# Patient Record
Sex: MALE | Race: WHITE | HISPANIC OR LATINO | Employment: UNEMPLOYED | URBAN - METROPOLITAN AREA
[De-identification: names, ages, dates, MRNs, and addresses within clinical notes are randomized per-mention and may not be internally consistent; named-entity substitution may affect disease eponyms.]

---

## 2018-06-05 ENCOUNTER — HOSPITAL ENCOUNTER (OUTPATIENT)
Facility: MEDICAL CENTER | Age: 48
End: 2018-06-05
Attending: PHYSICIAN ASSISTANT
Payer: MEDICARE

## 2018-06-05 LAB
ALBUMIN SERPL BCP-MCNC: 3.8 G/DL (ref 3.2–4.9)
ALBUMIN/GLOB SERPL: 1 G/DL
ALP SERPL-CCNC: 104 U/L (ref 30–99)
ALT SERPL-CCNC: 31 U/L (ref 2–50)
ANION GAP SERPL CALC-SCNC: 7 MMOL/L (ref 0–11.9)
AST SERPL-CCNC: 19 U/L (ref 12–45)
BILIRUB SERPL-MCNC: 0.5 MG/DL (ref 0.1–1.5)
BUN SERPL-MCNC: 26 MG/DL (ref 8–22)
CALCIUM SERPL-MCNC: 8.9 MG/DL (ref 8.5–10.5)
CHLORIDE SERPL-SCNC: 106 MMOL/L (ref 96–112)
CO2 SERPL-SCNC: 27 MMOL/L (ref 20–33)
CREAT SERPL-MCNC: 1.17 MG/DL (ref 0.5–1.4)
GLOBULIN SER CALC-MCNC: 3.7 G/DL (ref 1.9–3.5)
GLUCOSE SERPL-MCNC: 115 MG/DL (ref 65–99)
POTASSIUM SERPL-SCNC: 3.7 MMOL/L (ref 3.6–5.5)
PROT SERPL-MCNC: 7.5 G/DL (ref 6–8.2)
SODIUM SERPL-SCNC: 140 MMOL/L (ref 135–145)
T4 FREE SERPL-MCNC: 0.84 NG/DL (ref 0.53–1.43)
TSH SERPL DL<=0.005 MIU/L-ACNC: 3.72 UIU/ML (ref 0.38–5.33)

## 2018-06-05 PROCEDURE — 80053 COMPREHEN METABOLIC PANEL: CPT

## 2018-06-05 PROCEDURE — 84439 ASSAY OF FREE THYROXINE: CPT

## 2018-06-05 PROCEDURE — 84443 ASSAY THYROID STIM HORMONE: CPT

## 2018-06-18 ENCOUNTER — PHYSICAL THERAPY (OUTPATIENT)
Dept: PHYSICAL THERAPY | Facility: REHABILITATION | Age: 48
End: 2018-06-18
Attending: PHYSICIAN ASSISTANT
Payer: MEDICARE

## 2018-06-18 DIAGNOSIS — G81.94 LEFT HEMIPARESIS (HCC): ICD-10-CM

## 2018-06-18 PROCEDURE — 97162 PT EVAL MOD COMPLEX 30 MIN: CPT

## 2018-06-18 ASSESSMENT — ENCOUNTER SYMPTOMS
PAIN SCALE AT LOWEST: 0
EXACERBATED BY: OVERHEAD ACTIVITY
EXACERBATED BY: GRIPPING
PAIN SCALE AT HIGHEST: 0
PAIN SCALE: 0
EXACERBATED BY: WALKING

## 2018-06-18 ASSESSMENT — BALANCE ASSESSMENTS
PLACE ALTERNATE FOOT ON STEP OR STOOL WHILE STANDING UNSUPPORTED: 3
STANDING TO SITTING: 4
SITTING TO STANDING: 4
STANDING UNSUPPORTED ONE FOOT IN FRONT: 3
LONG VERSION TOTAL SCORE (MAX 56): 49
STANDING ON ONE LEG: 2
STANDING UNSUPPORTED WITH FEET TOGETHER: 4
SITTING UNSUPPORTED: 4
LOOK OVER LEFT AND RIGHT SHOULDERS WHILE STANDING: 3
STANDING UNSUPPORTED WITH EYES CLOSED: 4
TRANSFERS: 4
TURN 360 DEGREES: 3
LONG VERSION TOTAL SCORE (MAX 56): 49
STANDING UNSUPPORTED: 4
PICK UP OBJECT FROM THE FLOOR FROM A STANDING POSITION: 4
REACHING FORWARD WITH OUTSTRETCHED ARM WHILE STANDING: 3

## 2018-06-18 NOTE — OP THERAPY EVALUATION
Outpatient Physical Therapy  INITIAL NEUROLOGICAL EVALUATION    [facility] [address].  [address]  [place] [address]  Phone:  [phone]  Fax:  [phone]    Date of Evaluation: 2018    Patient: Lamont Delcid  YOB: 1970  MRN: [id number]     Referring Provider: Gretchen Edgar P.A.-C.  [address] [facility] [address]  [address]  [place] [address]   Referring Diagnosis Hemiplegia, unspecified affecting left nondominant side [G81.94];Nontraumatic intracerebral hemorrhage, unspecified [I61.9]     Time Calculation  Start time: 1500  Stop time: 1600 Time Calculation (min): 60 minutes     Physical Therapy Occurrence Codes    Date of onset of impairment:  6/18/15   Date physical therapy care plan established or reviewed:  18   Date physical therapy treatment started:  18          Chief Complaint: Weakness and Loss Of Balance    Visit Diagnoses     ICD-10-CM   1. Left hemiparesis (HCC) G81.94       Subjective:   History of Present Illness:     Mechanism of injury:  CVA approx 3.5 yrs ago. Pt continues w/decreased function related to L UE weakness and tone. Pt has milder issues in his L LE. Pt had 3 months of therapy in [place] immediately after CVA. Pt also reports a more recent bout of therapy however pt has new insurance that was no longer accepted. He would like to continue working on his hand function and mild balance deficits.  Headaches:  no headaches  Sleep disturbance:  Not disrupted  Pain:     Current pain ratin    At best pain ratin    At worst pain ratin    Aggravating factors:  Gripping, overhead activity and walking  Treatments:     Treatment Comments:  Pt reports using a form of e-stim that allowed him to open his hand functionally. Pt states injections into his hand and forearm were discussed but that there was never any follow thru.  Activities of Daily Living:     Patient reported ADL status: Pt is independent w/ADLs, however reports being  unable to use his L hand functionally. Struggles most trying to help his wife w/things around the house.  Patient Goals:     Patient goals for therapy:  Increased strength and return to work    Other patient goals:  Pt is most concerned about the function in his hand and any potential there may be for RTW.      No past medical history on file.  No past surgical history on file.  Social History   Substance Use Topics   • Smoking status: Not on file   • Smokeless tobacco: Not on file   • Alcohol use Not on file     Family and Occupational History     Social History   • Marital status:      Spouse name: N/A   • Number of children: N/A   • Years of education: N/A       Objective:   Active Range of Motion:   Upper extremity (left):     Elbow extension: Below functional limits (-30 degrees)    Wrist extension: Below functional limits    Fingers flexion: Below functional limits    Fingers extension: Below functional limits (unable to break flexor tone)    Fingers abduction: Below functional limits    Fingers adduction: Below functional limits  Upper extremity (right):     All right upper extremity active range of motion: All within functional limits      Strength:     Right upper extremity strength within functional limits.    Upper extremity strength (left):     Shoulder flexion: 4+    Shoulder abduction: 4+    Shoulder external rotation:  4-    Shoulder internal rotation: 4+    Elbow flexion: 4+    Elbow extension: 4+    Wrist extension: 3-    Fingers extension: 2-    , Prehension, Pinch:  /Prehension (left):      strength: Impaired    Opposition: Impaired  Pinch (left):     Pinch (tip to tip): Impaired    Pinch (3 point): Impaired    Tone, Sensation and Coordination:   Tone:     Left upper extremity tone: L shoulder, elbow, and hand flexors = 1+     Left lower extremity tone: L hip, knee, and plantar flexors = 1+ to 2.    Sensation   Upper extremity (left):     Light touch: Intact  Upper extremity  (right):     Light touch: Intact  Lower extremity (left):     Light touch: Intact  Lower extremity (right):     Light touch: Intact    Coordination   Upper extremity (left):     Fine motor: Impaired    Rapid alternating movements: Impaired    Finger touch to nose: Impaired    Observational Gait   Gait: vaulting     Walking speed and stride length below functional limits.    Left foot contact pattern: foot flat  Left arm swing: decreased      Exercises/Treatment  Time-based treatments/modalities:          Assessment, Response and Plan:   Assessment details:  48 yr old male 3 1/2 years post-CVA. Pt appears w/mild gait and balance dysfunction and more pronounced L UE/hand issues. Pt to benefit from skilled PT to address gait, balance, posture, and gross UE function. Recommend pt f/u w/occupational therapy to address fine motor function.    Goals:   Short Term Goals:   Pt able to stand w/NBOS > 30 sec for improved safety during ambulation.  Pt able to participate in gym/home program for UE strengthening w/use of straps as needed for L UE  Short term goal time span:  2-4 weeks      Long Term Goals:    Pt able to stand on one foot bilaterally >10 seconds  Pt to report 50% improved ability to assist wife w/household tasks.  Long term goal time span:  4-6 weeks    Plan:   Therapy options:  Physical therapy treatment to continue  Planned therapy interventions:  E Stim Unattended (CPT 72836), Gait Training (CPT 56506), Manual Therapy (CPT 48768), Neuromuscular Re-education (CPT 24971) and Therapeutic Exercise (CPT 07248)  Frequency:  2x week  Duration in weeks:  6  Plan details:  Cont skilled PT to address gait, balance, posture, and gross UE function.      Functional Limitation G-Codes and Severity Modifiers  Blevins Balance Total Score (0-56): 49       Referring provider co-signature:  I have reviewed this plan of care and my co-signature certifies the need for services.      Physician Signature:  ________________________________ Date: ______________

## 2018-06-29 ENCOUNTER — PHYSICAL THERAPY (OUTPATIENT)
Dept: PHYSICAL THERAPY | Facility: REHABILITATION | Age: 48
End: 2018-06-29
Attending: PHYSICIAN ASSISTANT
Payer: MEDICARE

## 2018-06-29 DIAGNOSIS — G81.94 LEFT HEMIPARESIS (HCC): ICD-10-CM

## 2018-06-29 PROCEDURE — 97110 THERAPEUTIC EXERCISES: CPT

## 2018-06-29 PROCEDURE — 97112 NEUROMUSCULAR REEDUCATION: CPT

## 2018-06-29 NOTE — OP THERAPY DAILY TREATMENT
Outpatient Physical Therapy  DAILY TREATMENT     [facility] Physical [address].  [address]  [place] [address]  Phone:  [phone]  Fax:  [phone]    Date: 06/29/2018    Patient: Lamont Delcid  YOB: 1970  MRN: [id number]     Time Calculation  Start time: 1030  Stop time: 1125 Time Calculation (min): 55 minutes     Chief Complaint: Loss Of Balance    Visit #: 2    SUBJECTIVE:  Pt still most concerned about L hand.    OBJECTIVE:      Therapeutic Exercises (CPT 76511):     1. Elliptical, x5 min, w/A to contol L knee hyperextension    2. 1/2 foam heel/toe rocking, x20    3. Tandem balance on 1/2 foam, x1 min ea    4. Foam Roller, pec stretch, alt UE OH and lat    5. Ball curls, x30    6. Ball bridge, 3x10    7. UBE, x5 min    Therapeutic Treatments and Modalities:     1. Neuromuscular Re-education (CPT 42651), Obstacle course w/step up, over, 1/2 foam, dynadiscs    Time-based treatments/modalities:  Therapeutic exercise minutes (CPT 55038): 45 minutes  Neuromusc re-ed, balance, coor, post minutes (CPT 42887): 10 minutes       Pain rating before treatment: 0  Pain rating after treatment: 0    ASSESSMENT:   Pt struggled to maintain neutral pelvis. Tolerated high level balance w/mild decreased rxn time.    PLAN/RECOMMENDATIONS:   Plan for treatment: therapy treatment to continue next visit.  Planned interventions for next visit: neuromuscular re-education (CPT 94862) and therapeutic exercise (CPT 69722).

## 2018-07-02 ENCOUNTER — PHYSICAL THERAPY (OUTPATIENT)
Dept: PHYSICAL THERAPY | Facility: REHABILITATION | Age: 48
End: 2018-07-02
Attending: PHYSICIAN ASSISTANT
Payer: MEDICARE

## 2018-07-02 DIAGNOSIS — G81.94 LEFT HEMIPARESIS (HCC): ICD-10-CM

## 2018-07-02 PROCEDURE — 97110 THERAPEUTIC EXERCISES: CPT

## 2018-07-02 NOTE — OP THERAPY DAILY TREATMENT
Outpatient Physical Therapy  DAILY TREATMENT     [facility] Physical [address].  [address]  [place] [address]  Phone:  [phone]  Fax:  [phone]    Date: 07/02/2018    Patient: Lamont Delcid  YOB: 1970  MRN: [id number]     Time Calculation  Start time: 1030  Stop time: 1125 Time Calculation (min): 55 minutes     Chief Complaint: Loss Of Balance    Visit #: 3    SUBJECTIVE:  Pt still most concerned about L hand but wants to continue w/PT for balance.    OBJECTIVE:      Therapeutic Exercises (CPT 71863):     1. Elliptical, x5 min    2. DL balance on BOSU, x1 min    3. 1/4 squats on dynadiscs, x20    4. Rows w/blue TB standing on dbl blue foam, x20    5. Ball curls and bridges alternating, x10 ea    6. SL stance on folder mat pushing skateboard w/opposite foot fwd/back, x20 ea    7. UBE, x5 min      Time-based treatments/modalities:  Therapeutic exercise minutes (CPT 52923): 55 minutes       Pain rating before treatment: 0  Pain rating after treatment: 0    ASSESSMENT:   Very poor control of L knee, relies on hyperextension for balance/control.    PLAN/RECOMMENDATIONS:   Plan for treatment: therapy treatment to continue next visit. Pt to begin OT this week.

## 2018-07-05 ENCOUNTER — APPOINTMENT (OUTPATIENT)
Dept: OCCUPATIONAL THERAPY | Facility: REHABILITATION | Age: 48
End: 2018-07-05
Attending: PHYSICIAN ASSISTANT
Payer: MEDICARE

## 2018-07-06 ENCOUNTER — APPOINTMENT (OUTPATIENT)
Dept: PHYSICAL THERAPY | Facility: REHABILITATION | Age: 48
End: 2018-07-06
Attending: PHYSICIAN ASSISTANT
Payer: MEDICARE

## 2018-07-09 ENCOUNTER — OCCUPATIONAL THERAPY (OUTPATIENT)
Dept: OCCUPATIONAL THERAPY | Facility: REHABILITATION | Age: 48
End: 2018-07-09
Attending: PHYSICIAN ASSISTANT
Payer: MEDICARE

## 2018-07-09 ENCOUNTER — PHYSICAL THERAPY (OUTPATIENT)
Dept: PHYSICAL THERAPY | Facility: REHABILITATION | Age: 48
End: 2018-07-09
Attending: PHYSICIAN ASSISTANT
Payer: MEDICARE

## 2018-07-09 DIAGNOSIS — I61.9 RIGHT-SIDED NONTRAUMATIC INTRACEREBRAL HEMORRHAGE, UNSPECIFIED CEREBRAL LOCATION (HCC): ICD-10-CM

## 2018-07-09 DIAGNOSIS — G81.94 HEMIPARESIS OF LEFT NONDOMINANT SIDE DUE TO CEREBROVASCULAR DISEASE (HCC): ICD-10-CM

## 2018-07-09 DIAGNOSIS — G81.94 LEFT HEMIPARESIS (HCC): ICD-10-CM

## 2018-07-09 DIAGNOSIS — I67.9 HEMIPARESIS OF LEFT NONDOMINANT SIDE DUE TO CEREBROVASCULAR DISEASE (HCC): ICD-10-CM

## 2018-07-09 PROCEDURE — 97166 OT EVAL MOD COMPLEX 45 MIN: CPT

## 2018-07-09 PROCEDURE — 97112 NEUROMUSCULAR REEDUCATION: CPT

## 2018-07-09 PROCEDURE — 97110 THERAPEUTIC EXERCISES: CPT

## 2018-07-09 ASSESSMENT — ENCOUNTER SYMPTOMS: PAIN SCALE: 0

## 2018-07-09 NOTE — OP THERAPY EVALUATION
Outpatient Occupational Therapy  INITIAL NEUROLOGICAL EVALUATION    [facility] Occup[facility] Therapy [address]  [address]  [place] [address]  Phone:  [phone]  Fax:  [phone]    Date of Evaluation: 2018    Patient: Lamont Delcid  YOB: 1970  MRN: [id number]     Referring Provider: Gretchen Edgar P.A.-C.  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 Hemiparesis of left nondominant side due to cerebrovascular disease (HCC) I67.9    G81.94   2. Right-sided nontraumatic intracerebral hemorrhage, unspecified cerebral location (HCC) I61.9       Subjective:   History of Present Illness:     Date of onset:  2015    Mechanism of injury:  S/p right CVA 6/18/15 with residual left hemipareisis and spasticity affecting ability to perform IADLs and work  Prior level of function:  Independent ADLs, working full-time in construction prior to CVA  Pain:     Current pain ratin  Social Support:     Lives in:  One-story house    Lives with:  Spouse (2 teenage boys)  Hand dominance:  Right  Treatments:     Previous treatment:  Physical therapy    Current treatment:  Orthotics    Treatment Comments:  [facility] 18  Activities of Daily Living:     Patient reported ADL status: Modified independent ADLs, Independent driving, Independent walking.  Wife performs homemaking.  Patient  Goals:     Patient goals for therapy:  Return to work, increased strength, increased motion, independence with ADLs/IADLs and return to sport/leisure activities    Other patient goals:  To cook for myself and work      No past medical history on file.  No past surgical history on file.  Social History   Substance Use Topics   • Smoking status: Not on file   • Smokeless tobacco: Not on file   • Alcohol use Not on file     Family and Occupational History     Social History   • Marital status:      Spouse name: N/A   • Number of children: N/A   • Years of education: N/A       Objective:   Active Range of Motion:   Upper extremity (left):     Shoulder flexion: Within functional limits    Shoulder extension: Within functional limits    Shoulder abduction: Within functional limits    Shoulder external rotation: Within functional limits    Shoulder internal rotation: Within functional limits    Elbow flexion: Within functional limits    Elbow extension: Below functional limits    Forearm supination: Below functional limits (45 degrees)    Wrist flexion: Within functional limits    Wrist extension: Within functional limits    Fingers flexion: Below functional limits    Fingers extension: Below functional limits    Fingers abduction: Below functional limits    Fingers adduction: Below functional limits  Upper extremity (right):     All right upper extremity active range of motion: All within functional limits  Active range of motion comments: Left elbow AROM , left thumb with reducible flexion contracture at IP joint and SF at PIP joint.        Passive Range of Motion:   Upper extremity (left):     All left upper extremity passive range of motion: All within functional limits    Strength:     Right upper extremity strength within functional limits.    Upper extremity strength (left):     Shoulder flexion: 4    Shoulder extension:  4    Shoulder abduction: 4    Shoulder adduction: 4    Shoulder external rotation:   4    Shoulder internal rotation: 4    Elbow flexion: 4    Elbow extension: 4-    Forearm pronation: 4    Forearm supination: 3-    Wrist flexion: 4    Wrist extension: 3-    Fingers flexion: 3- (with tonal influence)    Fingers extension: 0 (initially with no active extension)    Fingers abduction: 0    Fingers adduction: 2-    , Prehension, Pinch:  /Prehension (left):      strength: Impaired (15lbs)    Opposition: Impaired  /Prehension (right):      strength: Within functional limits (97 lbs)    Opposition: Within functional limits  Pinch (left):     Pinch Left Lateral: 9 lbs.  Pinch (right):    Pinch (lateral): Within functional limits (19 lbs)    Strength Comments:  After NM re-ed: Left digit extension: digits 3 & 4: 2-/5 strength,  Digits 2 and 5: trace, thumb: 0/5    Tone, Sensation and Coordination:   Tone:     Left upper extremity muscle tone: Hypertonic    Right upper extremity muscle tone: Normal    Modified Juanita:   Upper extremity (left):     Elbow flexors: 1+    Wrist flexors: 1+    Finger flexors: 1+    Tone comments:   Left thumb flexors/adductors: Grade 2    Sensation   Upper extremity (left):     Light touch: Intact    Proprioception: Intact   Upper extremity (right):     Light touch: Intact    Proprioception: Intact     Coordination   Upper extremity (left):     Fine motor: Impaired    Gross motor: Impaired  Upper extremity (right):     Fine motor: Within functional limits    Gross motor: Within functional limits      Coordination comments:   Able to grasp with LH with tonal influence, unable to actively extend once in composite fist    Cognition:     Orientation: normal to time, normal to place and normal to person    Direction following: three step    Short term memory: intact    Long term memory: intact    Attention span: intact    Sequencing: intact    Problem solving: intact    Judgement and safety awareness: intact    Hearing: intact    Vision/Perception:     Visual  tracking: intact    Convergence: intact    Visual attentions: intact    Visual acuity: intact    Visual scanning: intact    R/L neglect: not present        Therapeutic Treatments and Modalities:    1. Neuromuscular Re-education (CPT 62649)    Therapeutic Treatments and Modalities Summary: High speed vibration to left dorsal forearm combined with quick stretch of digit extensors and thumb held in abduction for multiple repetitions of digit extension, successful with MF/RF > IF/SF    Time-based treatments/modalities:  Neuromusc re-ed minutes (CPT 90418): 15 minutes        Assessment, Response and Plan:   Impairments: abnormal coordination, abnormal muscle firing, abnormal muscle tone, abnormal or restricted ROM, difficulty performing job, fine motor function, impaired physical strength and lacks appropriate home exercise program    Assessment details:  Pt is a 48 y.o male s/p right CVA 6/18/15 who presents to outpatient OT functioning below baseline secondary to residual left distal hemipareisis, flexor spasticity, and soft tissue restrictions affecting his ability to perform IADLs and return to work.    Prognosis: good    Goals:   Short Term Goals:   Pt will incorporate his left hand as a gross functional assist during ADLs  Pt will be able to independently relax and remove his left hand after grasping a cylindrical item with moderate difficulty  Pt will be independent positioning of LUE to prevent further contractures  Pt will increase his left digit extension strength 1/2 grade for functional release of objects    Short term goal timespan:  2-4 weeks    Long Term Goals:   Pt will demonstrate a functional left hand grasp and release of ADL/IADL objects with mild difficulty and extra time  Pt will increase his left digit extension strength to >=  2/5 to engage in pre-work activities  Pt will be independent light hot meal prep and clean-up with bilateral integration  Pt will be independent [facility] for stretching and  strengthening         OT long term goal timespan: 8-10.    Plan:   Therapy options:  Occupational therapy treatment to continue  Planned therapy interventions:  E Stim Attended (CPT 46915), Functional Training, Self Care (CPT 69272), Manual Therapy (CPT 13192), Neuromuscular Re-education (CPT 42389), Orthotic Training (CPT 70770), Therapeutic Activities (CPT 82714) and Therapeutic Exercise (CPT 68529)  Frequency:  2x week  Duration in weeks:  10  Duration in visits:  20  Discussed with:  Patient      Functional Limitation G-Codes and Severity Modifiers      Current:     Goal:         Referring provider co-signature:  I have reviewed this plan of care and my co-signature certifies the need for services.  Certification Dates:   From 7/9/18     To 9/27/18    Physician Signature: ________________________________ Date: ______________

## 2018-07-10 NOTE — OP THERAPY DAILY TREATMENT
Outpatient Physical Therapy  DAILY TREATMENT     [facility] Physical [address]  [address]  [place] [address]  Phone:  [phone]  Fax:  [phone]    Date: 07/09/2018    Patient: Lamont Delcid  YOB: 1970  MRN: [id number]     Time Calculation  Start time: 1030  Stop time: 1130 Time Calculation (min): 60 minutes     Chief Complaint: Loss Of Balance    Visit #: 4    SUBJECTIVE:  Pt still most concerned about hand function.    OBJECTIVE:      Therapeutic Exercises (CPT 56869):     1. Elliptical, x3 min    2. DL balance on BOSU, x1 min    3. Nu Step w/speed intervals, x10 min    4. Tandem walk fwd/back, 2x10 ft    5. Ball curls and bridges alternating, x10 ea    6. Sidestep, 2x10 ft    7. Steps across midline, x10 ea    8. Trunk rot w/feet on ball, x10 ea      Time-based treatments/modalities:  Therapeutic exercise minutes (CPT 46399): 60 minutes       ASSESSMENT:   Pt demos slightly improved balance rxns. May benefit from AFO to control L knee hyperextension if pt will be compliant w/use.    PLAN/RECOMMENDATIONS:   Cont PT 2-3 sessions to develop HEP

## 2018-07-11 ENCOUNTER — OCCUPATIONAL THERAPY (OUTPATIENT)
Dept: OCCUPATIONAL THERAPY | Facility: REHABILITATION | Age: 48
End: 2018-07-11
Attending: PHYSICIAN ASSISTANT
Payer: MEDICARE

## 2018-07-11 ENCOUNTER — PHYSICAL THERAPY (OUTPATIENT)
Dept: PHYSICAL THERAPY | Facility: REHABILITATION | Age: 48
End: 2018-07-11
Attending: PHYSICIAN ASSISTANT
Payer: MEDICARE

## 2018-07-11 DIAGNOSIS — I67.9 HEMIPARESIS OF LEFT NONDOMINANT SIDE DUE TO CEREBROVASCULAR DISEASE (HCC): ICD-10-CM

## 2018-07-11 DIAGNOSIS — G81.94 LEFT HEMIPARESIS (HCC): ICD-10-CM

## 2018-07-11 DIAGNOSIS — I61.9 RIGHT-SIDED NONTRAUMATIC INTRACEREBRAL HEMORRHAGE, UNSPECIFIED CEREBRAL LOCATION (HCC): ICD-10-CM

## 2018-07-11 DIAGNOSIS — G81.94 HEMIPARESIS OF LEFT NONDOMINANT SIDE DUE TO CEREBROVASCULAR DISEASE (HCC): ICD-10-CM

## 2018-07-11 PROCEDURE — 97112 NEUROMUSCULAR REEDUCATION: CPT

## 2018-07-11 PROCEDURE — 97110 THERAPEUTIC EXERCISES: CPT

## 2018-07-11 PROCEDURE — 97032 APPL MODALITY 1+ESTIM EA 15: CPT

## 2018-07-11 NOTE — OP THERAPY DAILY TREATMENT
"  Outpatient Occupational Therapy  DAILY TREATMENT     [facility] Occupational [address].  [address]  [place] [address]  Phone:  [phone]  Fax:  [phone]    Date: 07/11/2018    Patient: Lamont Delcid  YOB: 1970  MRN: [id number]     Time Calculation  Start time: 0100  Stop time: 0200 Time Calculation (min): 60 minutes     Chief Complaint: Extremity Weakness (left)    Visit #: 2    SUBJECTIVE:  \"I wear this finger board at night\"    OBJECTIVE:  Current objective measures:   Left hand digit extension: 2-/5 except thumb 0/5  Wrist/digit flexors: 1+ tone on MAS        Therapeutic Treatments and Modalities:    1. E Stim Attended (CPT 94293)    2. Neuromuscular Re-education (CPT 92637)    Therapeutic Treatments and Modalities Summary: TENS 25 Hz left proximal dorsal forearm for wrist/digit extension x 5 minutes, repeated with NMES 40Hz and thumb held in abduction x 10 minutes with good results.  Passive sustained stretch left wrist to digits. High speed vibration to left dorsal forearm, hand over wedge for gross digit extension, thumb held in abduction to decrease tone with good results  Extension exercises initiated rom relaxed state. Left hand digit extension paddle donned, inspected with good fit, instructed to wear only during the day 1-2 hours 2-3 x day  Standing WB over left wrist with hand paddle on, elbow extended 10 reps x 10 second hold  Active digit extension with thumb held in abduction for reproducible extension of all digits, incorporated quick stretch and associated reactions, initiated from a relaxed state    Time-based treatments/modalities:  Neuromusc re-ed minutes (CPT 05881): 45 minutes  E-stim attended minutes (CPT 33709): 15 minutes     ASSESSMENT:   Response to treatment: Pt making very good progress today with left hand neuromuscular recovery in response to both facilitory and inhibitory techniques described above with an increase in reproducible " digit 2-5 extension.  HEP updated and instructed on positioning with good understanding.    PLAN/RECOMMENDATIONS:   Plan for treatment: therapy treatment to continue next visit.  Planned interventions for next visit: continue with current treatment, E-stim attended (CPT 97805) and neuromuscular re-education (CPT 69109)

## 2018-07-11 NOTE — OP THERAPY DAILY TREATMENT
Outpatient Physical Therapy  DAILY TREATMENT     [facility] Physical [address].  [address]  [place] [address]  Phone:  [phone]  Fax:  [phone]    Date: 07/11/2018    Patient: Lamont Delcid  YOB: 1970  MRN: [id number]     Time Calculation  Start time: 0200  Stop time: 0300 Time Calculation (min): 60 minutes     Chief Complaint: Weakness and Loss Of Balance    Visit #: 5    SUBJECTIVE:  Started OT and is feels good about sessions. Wants to continue to work on strength, endurance, and balance during PT sessions.    OBJECTIVE:      Therapeutic Exercises (CPT 45419):     1. Nu Step w/speed intervals, x10 min    2. DL balance on BOSU, x1 min    3. Tandem walk fwd/back on 2x4, 2x10 ft    4. Sidestep on 2x4, 2x10 ft    5. Ball curls and bridges alternating, x10 ea    6. Seated on ball , alt UE, marching, x10 ea    7. Steps across midline, x10 ea    8. Marching w/UE across midline, x50 ft    9. Ski jump, 3x30 sec    10. Rows standing on dynadiscs, x20      Time-based treatments/modalities:  Therapeutic exercise minutes (CPT 37837): 60 minutes       ASSESSMENT:   Pt demos improving balance and good motivation. Struggles most w/control of L knee TKE.    PLAN/RECOMMENDATIONS:   Cont PT w/focus on balance and improving L TKE control.

## 2018-07-16 ENCOUNTER — OCCUPATIONAL THERAPY (OUTPATIENT)
Dept: OCCUPATIONAL THERAPY | Facility: REHABILITATION | Age: 48
End: 2018-07-16
Attending: PHYSICIAN ASSISTANT
Payer: MEDICARE

## 2018-07-16 ENCOUNTER — PHYSICAL THERAPY (OUTPATIENT)
Dept: PHYSICAL THERAPY | Facility: REHABILITATION | Age: 48
End: 2018-07-16
Attending: PHYSICIAN ASSISTANT
Payer: MEDICARE

## 2018-07-16 DIAGNOSIS — G81.94 HEMIPARESIS OF LEFT NONDOMINANT SIDE DUE TO CEREBROVASCULAR DISEASE (HCC): ICD-10-CM

## 2018-07-16 DIAGNOSIS — I67.9 HEMIPARESIS OF LEFT NONDOMINANT SIDE DUE TO CEREBROVASCULAR DISEASE (HCC): ICD-10-CM

## 2018-07-16 DIAGNOSIS — G81.94 LEFT HEMIPARESIS (HCC): ICD-10-CM

## 2018-07-16 DIAGNOSIS — I61.9 RIGHT-SIDED NONTRAUMATIC INTRACEREBRAL HEMORRHAGE, UNSPECIFIED CEREBRAL LOCATION (HCC): ICD-10-CM

## 2018-07-16 PROCEDURE — 97112 NEUROMUSCULAR REEDUCATION: CPT

## 2018-07-16 PROCEDURE — 97032 APPL MODALITY 1+ESTIM EA 15: CPT

## 2018-07-16 PROCEDURE — 97140 MANUAL THERAPY 1/> REGIONS: CPT

## 2018-07-16 PROCEDURE — 97110 THERAPEUTIC EXERCISES: CPT

## 2018-07-16 NOTE — OP THERAPY DAILY TREATMENT
"  Outpatient Occupational Therapy  DAILY TREATMENT     [facility] Occupational [address].  [address]  [place] [address]  Phone:  [phone]  Fax:  [phone]    Date: 07/16/2018    Patient: Lamont Delcid  YOB: 1970  MRN: [id number]     Time Calculation  Start time: 0200  Stop time: 0300 Time Calculation (min): 60 minutes     Chief Complaint: Extremity Weakness (left)    Visit #: 3    SUBJECTIVE: \"I'm good\"    OBJECTIVE:  Current objective measures:   Left hand digit extension: 2-/5 except thumb 0/5  Wrist/digit flexors: 1+ tone on MAS        Therapeutic Treatments and Modalities:    1. E Stim Attended (CPT 42379)    2. Neuromuscular Re-education (CPT 18815)    3. Manual Therapy (CPT 80432)    Therapeutic Treatments and Modalities Summary: Left RHO donned and inspected for fit, volar wrist bar adjusted to increase wrist extension, pt reported improved comfort.  To be worn at night when sleeping and during the day when resting.  NMES 45 Hz left proximal dorsal forearm for wrist/digit extension x 15 minutes with manipulation of pads to achieve maximum results, thumb placed in extension splint and occasionally held in abduction, WB through left forearm when stimulation inactive with good results.  STM to volar/dorsal forearm flexor > extensor musculature with and without [place] for release of trigger points followed by passive sustained stretch left wrist to digits.  Active digit extension a.g incorporating WB, quick stretch, thumb held in abduction, muscle belly tapping, dorsal pressure, and initiated from relaxed position over wedge.  Pt demonstrated ability to perform exercises independently.    Time-based treatments/modalities:  Neuromusc re-ed minutes (CPT 56827): 30 minutes  Manual therapy joint mobilization minutes (CPT 95132): 15 minutes  E-stim attended minutes (CPT 44474): 15 minutes     ASSESSMENT:   Response to treatment:  Pt continues to make good progress " with strength of left hand digit extensors of digits 2-5 in response to facilitory and manual techniques described above.  Pt able to perform multiple repetitions of gross digit extension at 3/4 range of full extension.  HEP and positioning updated with good understanding.  Pt remains highly motivated.    PLAN/RECOMMENDATIONS:   Plan for treatment: therapy treatment to continue next visit.  Planned interventions for next visit: continue with current treatment, E-stim attended (CPT 32329), manual therapy (CPT 86427), neuromuscular re-education (CPT 81337) and therapeutic activities (CPT 03238)

## 2018-07-16 NOTE — OP THERAPY DAILY TREATMENT
Outpatient Physical Therapy  DAILY TREATMENT     [facility] Physical [address].  [address]  [place] [address]  Phone:  [phone]  Fax:  [phone]    Date: 07/16/2018    Patient: Lamont Delcid  YOB: 1970  MRN: [id number]     Time Calculation  Start time: 1030  Stop time: 1130 Time Calculation (min): 60 minutes     Chief Complaint: Loss Of Balance and Weakness    Visit #: 6    SUBJECTIVE:  No new c/o, mildly fatigued after last visit.    OBJECTIVE:      Therapeutic Exercises (CPT 28593):     1. Nu Step w/speed intervals, x10 min, L5    2. DL balance on BOSU, x2 min    3. Tandem walk fwd/back on 2x4, 3x10 ft    4. Sidestep on 2x4, 3x10 ft    5. Ball curls and bridges alternating, x30ea    6. Prone alt UE/LE over ball, x10ea    7. Step over and back on 2x4, x10 ft ea    8. Step down, x20 ea, 6 inch step      Time-based treatments/modalities:  Therapeutic exercise minutes (CPT 21661): 60 minutes       ASSESSMENT:   Pt improved balance rxns w/gait activities w/NBOS    PLAN/RECOMMENDATIONS:   Cont PT, endurance, balance, TKE control.

## 2018-07-18 ENCOUNTER — PHYSICAL THERAPY (OUTPATIENT)
Dept: PHYSICAL THERAPY | Facility: REHABILITATION | Age: 48
End: 2018-07-18
Attending: PHYSICIAN ASSISTANT
Payer: MEDICARE

## 2018-07-18 ENCOUNTER — OCCUPATIONAL THERAPY (OUTPATIENT)
Dept: OCCUPATIONAL THERAPY | Facility: REHABILITATION | Age: 48
End: 2018-07-18
Attending: PHYSICIAN ASSISTANT
Payer: MEDICARE

## 2018-07-18 DIAGNOSIS — I61.9 RIGHT-SIDED NONTRAUMATIC INTRACEREBRAL HEMORRHAGE, UNSPECIFIED CEREBRAL LOCATION (HCC): ICD-10-CM

## 2018-07-18 DIAGNOSIS — I67.9 HEMIPARESIS OF LEFT NONDOMINANT SIDE DUE TO CEREBROVASCULAR DISEASE (HCC): ICD-10-CM

## 2018-07-18 DIAGNOSIS — G81.94 LEFT HEMIPARESIS (HCC): ICD-10-CM

## 2018-07-18 DIAGNOSIS — G81.94 HEMIPARESIS OF LEFT NONDOMINANT SIDE DUE TO CEREBROVASCULAR DISEASE (HCC): ICD-10-CM

## 2018-07-18 PROCEDURE — 97110 THERAPEUTIC EXERCISES: CPT

## 2018-07-18 PROCEDURE — 97032 APPL MODALITY 1+ESTIM EA 15: CPT

## 2018-07-18 PROCEDURE — 97112 NEUROMUSCULAR REEDUCATION: CPT

## 2018-07-18 NOTE — OP THERAPY DAILY TREATMENT
"  Outpatient Physical Therapy  DAILY TREATMENT     [facility] Physical Therapy [address].  [address]  [place] [address]  Phone:  [phone]  Fax:  [phone]    Date: 07/18/2018    Patient: Lamont Delcid  YOB: 1970  MRN: [id number]     Time Calculation  Start time: 1400  Stop time: 1430 Time Calculation (min): 30 minutes     Chief Complaint: Loss Of Balance and Weakness    Visit #: 7    SUBJECTIVE:  Reports doing well and feeling a little stronger w/exercises.    OBJECTIVE:      Therapeutic Exercises (CPT 19694):     1. Nu Step w/speed intervals, x10 min, L5    2. DL balance on BOSU, x1 min    3. Step up/down on BOSU, x10 ea    4. Lat over BOSU, x10 ea    5. 1/4 squat on flat BOSU, x20    6. Prone alt UE/LE over ball, 5x5\" ea      Time-based treatments/modalities:  Therapeutic exercise minutes (CPT 04952): 30 minutes       ASSESSMENT:   Struggled on ball today, demod decreased body awareness.    PLAN/RECOMMENDATIONS:   Cont PT, endurance, balance, TKE control.      "

## 2018-07-18 NOTE — OP THERAPY DAILY TREATMENT
"  Outpatient Occupational Therapy  DAILY TREATMENT     [facility] Occupational [address].  [address]  [place] [address]  Phone:  [phone]  Fax:  [phone]    Date: 07/18/2018    Patient: Lamont Delcid  YOB: 1970  MRN: [id number]     Time Calculation  Start time: 0100  Stop time: 0200 Time Calculation (min): 60 minutes     Chief Complaint: Extremity Weakness (left)    Visit #: 4    SUBJECTIVE: \"I can open my hand a lot more\"    OBJECTIVE:  Current objective measures:   Left hand digit extension: 2-/5 except thumb 0/5, digit flexion 3-/5 with tonal influence, wrist extension 3-/5  Wrist/digit flexors: 1+ tone on MAS        Therapeutic Treatments and Modalities:    1. E Stim Attended (CPT 37786)    2. Neuromuscular Re-education (CPT 17664)    Therapeutic Treatments and Modalities Summary: NMES 45 Hz left proximal dorsal forearm for wrist/digit extension x 15 minutes incorporating active digit extension with excellent response.  Performed active digit extension from relaxed state over wedge with and without external stimulation via muscle belly tapping, vibration, quick stretch, thumb held in abduction and WB following NMES, performed multiple reps with cues to cease extensor activity once digit flexion interferes r/t spasticity.  Left hand placed in curved hand paddle for standing WB through wrist against the wall x 30 seconds 8 reps, elbow extended at -30 degrees.  PNF diagonals D1/D2 patterns with paddle on followed by paddle off x 20 reps.  Grasp, relax, and release of beanbags placed on table.  Ended session with reproducible active gross digit extension initiated from relaxed state.   HEP updated with good understanding.      Time-based treatments/modalities:  Neuromusc re-ed minutes (CPT 56400): 45 minutes  E-stim attended minutes (CPT 44900): 15 minutes     ASSESSMENT:   Response to treatment:  Pt continues to make steady gains with distal LUE strength with " fluctuating levels of gross digit extension in response to facilitory and inhibitory techniques described above.  Improved ability to grasp, relax, and release objects with decreased influence of flexor spasticity.  HEP updated with good understanding.    PLAN/RECOMMENDATIONS:   Plan for treatment: therapy treatment to continue next visit.  Planned interventions for next visit: continue with current treatment, E-stim attended (CPT 67855), neuromuscular re-education (CPT 14978), orthotic training (CPT 48284) and therapeutic activities (CPT 40600)

## 2018-07-20 ENCOUNTER — APPOINTMENT (OUTPATIENT)
Dept: PHYSICAL THERAPY | Facility: REHABILITATION | Age: 48
End: 2018-07-20
Attending: PHYSICIAN ASSISTANT
Payer: MEDICARE

## 2018-07-23 ENCOUNTER — PHYSICAL THERAPY (OUTPATIENT)
Dept: PHYSICAL THERAPY | Facility: REHABILITATION | Age: 48
End: 2018-07-23
Attending: PHYSICIAN ASSISTANT
Payer: MEDICARE

## 2018-07-23 DIAGNOSIS — G81.94 LEFT HEMIPARESIS (HCC): ICD-10-CM

## 2018-07-23 PROCEDURE — 97110 THERAPEUTIC EXERCISES: CPT

## 2018-07-23 NOTE — OP THERAPY DAILY TREATMENT
Outpatient Physical Therapy  DAILY TREATMENT     [facility] Physical Therapy [address].  [address]  [place] [address]  Phone:  [phone]  Fax:  [phone]    Date: 07/23/2018    Patient: Lamont Delcid  YOB: 1970  MRN: [id number]     Time Calculation  Start time: 1130  Stop time: 1230 Time Calculation (min): 60 minutes     Chief Complaint: Loss Of Balance and Weakness    Visit #: 8    SUBJECTIVE:  Pt notes improved mobility of L hand, feels that both therapies are helping.    OBJECTIVE:      Therapeutic Exercises (CPT 81414):     1. Nu Step w/speed intervals, x10 min    2. SL balance on trampoline, 3x30 sec ea    3. Tandem walk fwd/back on 2x4, 3x10 ft    4. Sidestep on 2x4, 3x10 ft    5. DL Shuttle w/wobble board, x50, 6c    6. Steps across midline fwd and back w/OH ball reach, x10 ea bilaterally    7. Tandem walk fwd on 2x4 w/cone taps, 2x10 ft    8. TKE at wall w/ball, 20x10 sec      Time-based treatments/modalities:  Therapeutic exercise minutes (CPT 37769): 60 minutes       ASSESSMENT:   Pt demos improved ability to control L TKE w/cuing.    PLAN/RECOMMENDATIONS:   Cont PT w/cont focus on balance and improving L TKE control.

## 2018-07-25 ENCOUNTER — OCCUPATIONAL THERAPY (OUTPATIENT)
Dept: OCCUPATIONAL THERAPY | Facility: REHABILITATION | Age: 48
End: 2018-07-25
Attending: PHYSICIAN ASSISTANT
Payer: MEDICARE

## 2018-07-25 DIAGNOSIS — I61.9 RIGHT-SIDED NONTRAUMATIC INTRACEREBRAL HEMORRHAGE, UNSPECIFIED CEREBRAL LOCATION (HCC): ICD-10-CM

## 2018-07-25 DIAGNOSIS — G81.94 HEMIPARESIS OF LEFT NONDOMINANT SIDE DUE TO CEREBROVASCULAR DISEASE (HCC): ICD-10-CM

## 2018-07-25 DIAGNOSIS — I67.9 HEMIPARESIS OF LEFT NONDOMINANT SIDE DUE TO CEREBROVASCULAR DISEASE (HCC): ICD-10-CM

## 2018-07-25 PROCEDURE — 97032 APPL MODALITY 1+ESTIM EA 15: CPT

## 2018-07-25 PROCEDURE — 97112 NEUROMUSCULAR REEDUCATION: CPT

## 2018-07-25 NOTE — OP THERAPY DAILY TREATMENT
"  Outpatient Occupational Therapy  DAILY TREATMENT     [facility] Occupational [address].  [address]  [place] [address]  Phone:  [phone]  Fax:  [phone]    Date: 07/25/2018    Patient: Lamont Delcid  YOB: 1970  MRN: [id number]     Time Calculation  Start time: 0100  Stop time: 0130 Time Calculation (min): 30 minutes     Chief Complaint: Extremity Weakness (left)    Visit #: 5    SUBJECTIVE: \"Very good\"    OBJECTIVE:  Current objective measures:   Left hand digit extension: 2/5 except thumb 0/5, digit flexion 3-/5 with tonal influence, wrist extension 3-/5  Wrist/digit flexors: 1+ tone on MAS        Therapeutic Treatments and Modalities:    1. E Stim Attended (CPT 58733)    2. Neuromuscular Re-education (CPT 87240)    Therapeutic Treatments and Modalities Summary: NMES 43 Hz left dorsal forearm x 10 minutes for focus on thumb/digit extension combined with active movements with fair response of thumb extension EPL > EPB. Performed active digit extension from relaxed position over wedge, with and without thumb held in abduction, a.g and g.e, and from shoulder ff at 150 degrees to fully extend digits 2-5, added phill strap for IF/MF during above positions to maintain extension x 5 seconds before influence of flexor spasticity.  Ended session with reproducible active gross digit extension initiated from relaxed state- able to maintain full extension of digits 2-5 for 2 seconds.   HEP updated with good understanding.      Time-based treatments/modalities:  Neuromusc re-ed minutes (CPT 52534): 20 minutes  E-stim attended minutes (CPT 80343): 10 minutes     ASSESSMENT:   Response to treatment: Pt making good progress today with an increase in active extension of left hand digits 2-5 in response to facilitory techniques described above including use of phill strap to maintain extension for longer duration.  No active thumb extension.  HEP updated with good understanding. "     PLAN/RECOMMENDATIONS:   Plan for treatment: therapy treatment to continue next visit.  Planned interventions for next visit: continue with current treatment, E-stim attended (CPT 02958), neuromuscular re-education (CPT 38707), orthotic training (CPT 35529) and therapeutic activities (CPT 05763)

## 2018-08-08 ENCOUNTER — PHYSICAL THERAPY (OUTPATIENT)
Dept: PHYSICAL THERAPY | Facility: REHABILITATION | Age: 48
End: 2018-08-08
Attending: PHYSICIAN ASSISTANT
Payer: MEDICARE

## 2018-08-08 ENCOUNTER — OCCUPATIONAL THERAPY (OUTPATIENT)
Dept: OCCUPATIONAL THERAPY | Facility: REHABILITATION | Age: 48
End: 2018-08-08
Attending: PHYSICIAN ASSISTANT
Payer: MEDICARE

## 2018-08-08 DIAGNOSIS — I67.9 HEMIPARESIS OF LEFT NONDOMINANT SIDE DUE TO CEREBROVASCULAR DISEASE (HCC): ICD-10-CM

## 2018-08-08 DIAGNOSIS — G81.94 HEMIPARESIS OF LEFT NONDOMINANT SIDE DUE TO CEREBROVASCULAR DISEASE (HCC): ICD-10-CM

## 2018-08-08 DIAGNOSIS — G81.94 LEFT HEMIPARESIS (HCC): ICD-10-CM

## 2018-08-08 DIAGNOSIS — I61.9 RIGHT-SIDED NONTRAUMATIC INTRACEREBRAL HEMORRHAGE, UNSPECIFIED CEREBRAL LOCATION (HCC): ICD-10-CM

## 2018-08-08 PROCEDURE — 97110 THERAPEUTIC EXERCISES: CPT

## 2018-08-08 PROCEDURE — 97112 NEUROMUSCULAR REEDUCATION: CPT

## 2018-08-08 PROCEDURE — 97032 APPL MODALITY 1+ESTIM EA 15: CPT

## 2018-08-08 NOTE — OP THERAPY DAILY TREATMENT
Outpatient Physical Therapy  DAILY TREATMENT     [facility] Physical [address].  [address]  [place] [address]  Phone:  [phone]  Fax:  [phone]    Date: 08/08/2018    Patient: Lamont Delcid  YOB: 1970  MRN: [id number]     Time Calculation  Start time: 0230  Stop time: 0330 Time Calculation (min): 60 minutes     Chief Complaint: Weakness and Loss Of Balance    Visit #: 9    SUBJECTIVE:  Continues to report small improvements.    OBJECTIVE:      Therapeutic Exercises (CPT 08016):     1. Nu Step w/speed intervals, x10 min    2. DL balance on discs w/rowing, x20    3. Tandem walk fwd/back on 2x4, 3x10 ft    4. Sidestep on 2x4, 3x10 ft    5. DL Shuttle, x30, 8c    6. SL Shuttle, x20 ea, 6c    7. [place], 3x10 ft ea      Time-based treatments/modalities:  Therapeutic exercise minutes (CPT 07597): 60 minutes       ASSESSMENT:   Pt w/steady progress demonstrating increased balance rxns..    PLAN/RECOMMENDATIONS:   Cont PT w/cont focus on balance and improving L TKE control.

## 2018-08-08 NOTE — OP THERAPY DAILY TREATMENT
"  Outpatient Occupational Therapy  DAILY TREATMENT     [facility] Occupational [address].  [address]  [place] [address]  Phone:  [phone]  Fax:  [phone]    Date: 08/08/2018    Patient: Lamont Delcid  YOB: 1970  MRN: [id number]     Time Calculation  Start time: 0130  Stop time: 0230 Time Calculation (min): 60 minutes     Chief Complaint: Hand Weakness (left)    Visit #: 6    SUBJECTIVE: \"I've been doing the exercises\"    OBJECTIVE:  Current objective measures:   Left hand strength: digit extension: 2/5 except thumb 0/5, digit flexion 3-/5 with tonal influence, wrist extension 3-/5  Left hand tone: Wrist/digit 3-5 flexors: Grade 1 tone on MAS, Thumb/IF: Grade 1+ spasticity          Therapeutic Treatments and Modalities:    1. E Stim Attended (CPT 63942)    2. Neuromuscular Re-education (CPT 48117)    Therapeutic Treatments and Modalities Summary: NMES 35 Hz left dorsal forearm x 10 minutes for focus on wrist/digit extension followed by thumb/IF extension x 5 minutes with improved response of thumb extension EPL > EPB. Performed active gross digit extension from relaxed position over wedge to digit extension with shoulder ff at 150 degrees to fully extend digits 2-5, moderate influence of flexor spasticity in digits 1 and 2, especially when fatigued.  Transport of warm water from 1 basin to another using LH with wash cloth with focus on gross digit extension/flexion.  Fabricated thumb extension strap to increase extension at MCP joint and web space for improved functional grasp/release of objects, practiced with dry wash cloth on table.  WB through left hand with thumb held in abduction and dorsal pressure x 10 reps.  Active digit extension increased at end of session.  HEP updated with good understanding.      Time-based treatments/modalities:  Neuromusc re-ed minutes (CPT 20621): 45 minutes  E-stim attended minutes (CPT 72838): 15 minutes      ASSESSMENT: "   Response to treatment: Pt making progress with left hand neuromuscular recovery in response to both facilitory and inhibitory techniques described above with an increase in gross digit extension of digits 2-5.  Thumb remains without any volitional movement.  Thumb extension strap fabricated to increase ability to grasp and release during functional activities.  HEP updated with good understanding.    PLAN/RECOMMENDATIONS:   Plan for treatment: therapy treatment to continue next visit.  Planned interventions for next visit: continue with current treatment, E-stim attended (CPT 70072), neuromuscular re-education (CPT 02845), orthotic training (CPT 80227) and therapeutic activities (CPT 28472)

## 2018-08-10 ENCOUNTER — APPOINTMENT (OUTPATIENT)
Dept: PHYSICAL THERAPY | Facility: REHABILITATION | Age: 48
End: 2018-08-10
Attending: PHYSICIAN ASSISTANT
Payer: MEDICARE

## 2018-08-14 ENCOUNTER — APPOINTMENT (OUTPATIENT)
Dept: OCCUPATIONAL THERAPY | Facility: REHABILITATION | Age: 48
End: 2018-08-14
Attending: PHYSICIAN ASSISTANT
Payer: MEDICARE

## 2018-08-14 ENCOUNTER — APPOINTMENT (OUTPATIENT)
Dept: PHYSICAL THERAPY | Facility: REHABILITATION | Age: 48
End: 2018-08-14
Attending: PHYSICIAN ASSISTANT
Payer: MEDICARE

## 2018-08-15 ENCOUNTER — OCCUPATIONAL THERAPY (OUTPATIENT)
Dept: OCCUPATIONAL THERAPY | Facility: REHABILITATION | Age: 48
End: 2018-08-15
Attending: PHYSICIAN ASSISTANT
Payer: MEDICARE

## 2018-08-15 DIAGNOSIS — G81.94 HEMIPARESIS OF LEFT NONDOMINANT SIDE DUE TO CEREBROVASCULAR DISEASE (HCC): ICD-10-CM

## 2018-08-15 DIAGNOSIS — I67.9 HEMIPARESIS OF LEFT NONDOMINANT SIDE DUE TO CEREBROVASCULAR DISEASE (HCC): ICD-10-CM

## 2018-08-15 DIAGNOSIS — I61.9 RIGHT-SIDED NONTRAUMATIC INTRACEREBRAL HEMORRHAGE, UNSPECIFIED CEREBRAL LOCATION (HCC): ICD-10-CM

## 2018-08-15 PROCEDURE — 97760 ORTHOTIC MGMT&TRAING 1ST ENC: CPT

## 2018-08-15 PROCEDURE — 97014 ELECTRIC STIMULATION THERAPY: CPT

## 2018-08-15 NOTE — OP THERAPY DAILY TREATMENT
"  Outpatient Occupational Therapy  DAILY TREATMENT     [facility] Occupational [address].  [address]  [place] [address]  Phone:  [phone]  Fax:  [phone]    Date: 08/15/2018    Patient: Lamont Delcid  YOB: 1970  MRN: [id number]     Time Calculation  Start time: 1035  Stop time: 1135 Time Calculation (min): 60 minutes     Chief Complaint: Hand Weakness (left)    Visit #: 7    SUBJECTIVE: \"I lost the hand strap\"    OBJECTIVE:  Current objective measures:   Left hand strength: digit extension: 2/5 except thumb 0/5, digit flexion 3-/5 with tonal influence, wrist extension 3-/5  Left hand tone: Wrist flexors: Grade 1 tone on MAS, Thumb flexors/adductors Grade 1+ spasticity.        Therapeutic Treatments and Modalities:    1. E Stim Attended (CPT 50737)    2. Orthotic Training (CPT 58805)    Therapeutic Treatments and Modalities Summary: NMES 48 Hz left dorsal forearm x 10 minutes for focus on wrist/digit extension and thumb incorporating active movements.  Active digit extension with shoulder in 150 degrees of ff.  Fabricated left hand based orthoplast thumb spica splint to maintain functional hand position given no active thumb extension.  Pt reported comfort along with an improved ability to use left hand functionally including an increase in active digit extension in digits 2-5.  Pt instructed on skin inspection and wear schedule during the day.    Time-based treatments/modalities:  Orthotics training initial, minutes (CPT 01731): 55 minutes  E-stim attended minutes (CPT 00576): 10 minutes      ASSESSMENT:   Response to treatment:  Pt responded well to fabrication of left thumb spica splint to maintain hand in functional position given no active thumb extension.  Splint facilitated an improvement active digit extension and will prevent contractures of thumb flexors.  Pt pleased with results and verbalized good understanding of wear schedule/skin inspection.  " Continue with HEP.    PLAN/RECOMMENDATIONS:   Plan for treatment: therapy treatment to continue next visit.  Planned interventions for next visit: continue with current treatment, E-stim attended (CPT 94856), neuromuscular re-education (CPT 69625), orthotic training (CPT 40104) and therapeutic activities (CPT 62266)

## 2018-08-20 ENCOUNTER — APPOINTMENT (OUTPATIENT)
Dept: OCCUPATIONAL THERAPY | Facility: REHABILITATION | Age: 48
End: 2018-08-20
Payer: MEDICARE

## 2018-08-22 ENCOUNTER — OCCUPATIONAL THERAPY (OUTPATIENT)
Dept: OCCUPATIONAL THERAPY | Facility: REHABILITATION | Age: 48
End: 2018-08-22
Attending: PHYSICIAN ASSISTANT
Payer: MEDICARE

## 2018-08-22 ENCOUNTER — PHYSICAL THERAPY (OUTPATIENT)
Dept: PHYSICAL THERAPY | Facility: REHABILITATION | Age: 48
End: 2018-08-22
Attending: PHYSICIAN ASSISTANT
Payer: MEDICARE

## 2018-08-22 ENCOUNTER — APPOINTMENT (OUTPATIENT)
Dept: OCCUPATIONAL THERAPY | Facility: REHABILITATION | Age: 48
End: 2018-08-22
Payer: MEDICARE

## 2018-08-22 DIAGNOSIS — G81.94 LEFT HEMIPARESIS (HCC): ICD-10-CM

## 2018-08-22 DIAGNOSIS — I67.9 HEMIPARESIS OF LEFT NONDOMINANT SIDE DUE TO CEREBROVASCULAR DISEASE (HCC): ICD-10-CM

## 2018-08-22 DIAGNOSIS — G81.94 HEMIPARESIS OF LEFT NONDOMINANT SIDE DUE TO CEREBROVASCULAR DISEASE (HCC): ICD-10-CM

## 2018-08-22 PROCEDURE — 97763 ORTHC/PROSTC MGMT SBSQ ENC: CPT

## 2018-08-22 PROCEDURE — 97110 THERAPEUTIC EXERCISES: CPT | Mod: XE

## 2018-08-22 NOTE — OP THERAPY DAILY TREATMENT
Outpatient Physical Therapy  DAILY TREATMENT     [facility] Physical Therapy [address]  [address]  [place] [address]  Phone:  [phone]  Fax:  [phone]    Date: 08/22/2018    Patient: Lamont Delcid  YOB: 1970  MRN: [id number]     Time Calculation  Start time: 1130  Stop time: 1200 Time Calculation (min): 30 minutes     Chief Complaint: Loss Of Balance and Weakness    Visit #: 10    SUBJECTIVE:  Continues to report small improvements.    OBJECTIVE:      Therapeutic Exercises (CPT 20258):     1. Nu Step w/speed intervals, x20 min    2. DL balance on BOSU, x2 min, EO & EC    3. Tandem walk fwd/back , 3x10 ft    4. DL shuttle w/wobble board, x30, 8c    5. Wall ball squats, x30    6. Ski jump, x1 min      Time-based treatments/modalities:  Therapeutic exercise minutes (CPT 96347): 30 minutes       ASSESSMENT:   Pt continues w/incremental improvements in LE strength and balance.    PLAN/RECOMMENDATIONS:   Cont PT w/cont focus on rotational/trunk control

## 2018-08-22 NOTE — OP THERAPY DAILY TREATMENT
"  Outpatient Occupational Therapy  DAILY TREATMENT     [facility] Occupational Therapy [address].  [address]  [place] [address]  Phone:  [phone]  Fax:  [phone]    Date: 08/22/2018    Patient: Lamont Delcid  YOB: 1970  MRN: [id number]     Time Calculation  Start time: 1100  Stop time: 1130 Time Calculation (min): 30 minutes     Chief Complaint: Hand Weakness (left)    Visit #: 8    SUBJECTIVE: \"I like the hand splint a lot but the thumb part broke off\"    OBJECTIVE:  Current objective measures:  Left thumb extension 0/5        Therapeutic Treatments and Modalities:    1. Orthotic Training (CPT 41621)    Therapeutic Treatments and Modalities Summary: Left hand-based thumb spica splint modified to re-attach thumb component with anterior/posterior reinforcement using orthoplast.  Added padding where appropriate and re-strapped thumb.  Flared out area of light pressure in web space.      Time-based treatments/modalities:  Orthotic/Prosthetic subsq visit, minutes (CPT 10543): 30 minutes      ASSESSMENT:   Response to treatment:  Pt responded well to modifications made to left hand-based thumb spica splint with reports of improved comfort and stability.  Verbalized understanding of wear schedule and on-going skin inspection.    PLAN/RECOMMENDATIONS:   Plan for treatment: therapy treatment to continue next visit.  Planned interventions for next visit: continue with current treatment, neuromuscular re-education (CPT 14721), orthotic training (CPT 63150) and therapeutic activities (CPT 64948)      "

## 2018-08-29 ENCOUNTER — OCCUPATIONAL THERAPY (OUTPATIENT)
Dept: OCCUPATIONAL THERAPY | Facility: REHABILITATION | Age: 48
End: 2018-08-29
Attending: PHYSICIAN ASSISTANT
Payer: MEDICARE

## 2018-08-29 DIAGNOSIS — I61.9 RIGHT-SIDED NONTRAUMATIC INTRACEREBRAL HEMORRHAGE, UNSPECIFIED CEREBRAL LOCATION (HCC): ICD-10-CM

## 2018-08-29 DIAGNOSIS — I67.9 HEMIPARESIS OF LEFT NONDOMINANT SIDE DUE TO CEREBROVASCULAR DISEASE (HCC): ICD-10-CM

## 2018-08-29 DIAGNOSIS — G81.94 HEMIPARESIS OF LEFT NONDOMINANT SIDE DUE TO CEREBROVASCULAR DISEASE (HCC): ICD-10-CM

## 2018-08-29 PROCEDURE — 97112 NEUROMUSCULAR REEDUCATION: CPT

## 2018-08-29 PROCEDURE — 97032 APPL MODALITY 1+ESTIM EA 15: CPT

## 2018-08-29 NOTE — OP THERAPY DAILY TREATMENT
"  Outpatient Occupational Therapy  DAILY TREATMENT     [facility] Occupational [address].  [address]  [place] [address]  Phone:  [phone]  Fax:  [phone]    Date: 08/29/2018    Patient: Lamont Delcid  YOB: 1970  MRN: [id number]     Time Calculation  Start time: 1030  Stop time: 1130 Time Calculation (min): 60 minutes     Chief Complaint: Extremity Weakness (left)    Visit #: 9    SUBJECTIVE: \"I can  things better with the hand splint, I'm wearing the splint during the day and at night, I can open my hand much better\"    OBJECTIVE:  Current objective measures:   Left hand strength: digit extension: 2/5 except thumb 0/5, digit flexion 3/5 with tonal influence, wrist extension 3-/5  Spasticity: Digit flexors: Grade 1 except thumb 1+, thumb adductors 1+, Wrist flexors: Grade 1        Therapeutic Treatments and Modalities:    1. E Stim Attended (CPT 05093)    2. Neuromuscular Re-education (CPT 58367)    Therapeutic Treatments and Modalities Summary: NMES 48 Hz left mid-distal dorsal forearm x 15 minutes for focus on wrist/digit extension and thumb incorporating gross flexion without stimulation, active attempts at extension in conjunction with stimulation.  Active digit/wrist  extension with shoulder at 150 degrees of abd multiple reps. Various sized cones to grasp, raise, and place over dynamic target, cones laid sideways on table for pt to move to upright position and return to side, grasp and placed cones at multiple areas of table using light functional grasp secondary to flexor spasticity.  Kinesiotape placed on left thumb to facilitate extension for improved performance of grasp/release of cones.  Grasp/release of small objects with LH and reinforced kinesiotape.  Pt to include into HEP.    Time-based treatments/modalities:  Neuromusc re-ed minutes (CPT 70103): 45 minutes  E-stim attended minutes (CPT 27507): 15 minutes     ASSESSMENT:   Response to " treatment:  Pt making good progress with left hand functional grasp and release of various sized objects with decreased influence of flexor tone during release component.  Significantly improved performance with use of kinesiotape to facilitate thumb extension.  Flexor tone of digits, specifically the thumb, is a contributing factor limiting active thumb extension and abduction. HEP updated with good understanding.    PLAN/RECOMMENDATIONS:   Plan for treatment: therapy treatment to continue next visit.  Planned interventions for next visit: continue with current treatment, E-stim attended (CPT 82764), neuromuscular re-education (CPT 18650), orthotic training (CPT 38261) and therapeutic activities (CPT 32245)

## 2018-09-05 ENCOUNTER — APPOINTMENT (OUTPATIENT)
Dept: OCCUPATIONAL THERAPY | Facility: REHABILITATION | Age: 48
End: 2018-09-05
Attending: PHYSICIAN ASSISTANT
Payer: MEDICARE

## 2018-09-05 ENCOUNTER — PHYSICAL THERAPY (OUTPATIENT)
Dept: PHYSICAL THERAPY | Facility: REHABILITATION | Age: 48
End: 2018-09-05
Attending: PHYSICIAN ASSISTANT
Payer: MEDICARE

## 2018-09-05 DIAGNOSIS — G81.94 LEFT HEMIPARESIS (HCC): ICD-10-CM

## 2018-09-05 DIAGNOSIS — I61.9 RIGHT-SIDED NONTRAUMATIC INTRACEREBRAL HEMORRHAGE, UNSPECIFIED CEREBRAL LOCATION (HCC): ICD-10-CM

## 2018-09-05 DIAGNOSIS — I67.9 HEMIPARESIS OF LEFT NONDOMINANT SIDE DUE TO CEREBROVASCULAR DISEASE (HCC): ICD-10-CM

## 2018-09-05 DIAGNOSIS — G81.94 HEMIPARESIS OF LEFT NONDOMINANT SIDE DUE TO CEREBROVASCULAR DISEASE (HCC): ICD-10-CM

## 2018-09-05 PROCEDURE — 97112 NEUROMUSCULAR REEDUCATION: CPT

## 2018-09-05 PROCEDURE — 97110 THERAPEUTIC EXERCISES: CPT

## 2018-09-05 NOTE — OP THERAPY DAILY TREATMENT
Outpatient Physical Therapy  DAILY TREATMENT     [facility] Physical Therapy [address]  [address]  [place] [address]  Phone:  [phone]  Fax:  [phone]    Date: 09/05/2018    Patient: Lamont Delcid  YOB: 1970  MRN: [id number]     Time Calculation  Start time: 0900  Stop time: 1000 Time Calculation (min): 60 minutes     Chief Complaint: Difficulty Walking; Loss Of Balance; and Weakness    Visit #: 11    SUBJECTIVE:  Pt reports balance is better and he is able to walk more easily.     OBJECTIVE:      Therapeutic Exercises (CPT 99868):     1. Nu Step , w/speed intervals x10 min, L5 x10 min    2. DL balance on BOSU, x2 min, EO & EC    3. Tandem walk fwd/back on 2x4, 3x10 ft    4. DL shuttle w/wobble board, x30, 8c    5. Wall ball squats, x30    6. Dead bug w/contra ball isometrics, x10 ea    7. Sidestep on 2x4, 3x10 ft    8. Trunk rot w/TB, x20 ea    9. Standing lower trunk rot, x20 ea      Time-based treatments/modalities:  Therapeutic exercise minutes (CPT 31694): 60 minutes       ASSESSMENT:   Pt demos improved functional balance, still needs consistent VCs to attend to L LE position.    PLAN/RECOMMENDATIONS:   Cont PT for rotational/trunk control, LE strength, balance.

## 2018-09-05 NOTE — OP THERAPY DAILY TREATMENT
"  Outpatient Occupational Therapy  DAILY TREATMENT     [facility] Occupational [address].  [address]  [place] [address]  Phone:  [phone]  Fax:  [phone]    Date: 09/05/2018    Patient: Lamont Delcid  YOB: 1970  MRN: [id number]     Time Calculation  Start time: 1000  Stop time: 1030 Time Calculation (min): 30 minutes     Chief Complaint: Extremity Weakness (left)    Visit #: 10    SUBJECTIVE: \"I'm under a lot of stress trying to find an apartment\"    OBJECTIVE:  Current objective measures:   Left hand strength: digit extension: 2/5 except thumb 0/5, digit flexion 3/5 with tonal influence, wrist extension 3-/5  Spasticity: Digit flexors: Grade 1 except thumb 1+, thumb adductors 1+, Wrist flexors: Grade 1        Therapeutic Treatments and Modalities:    1. Neuromuscular Re-education (CPT 25256)    Therapeutic Treatments and Modalities Summary: Kinesiotape placed on left thumb to facilitate extension/abduction for improved performance of left hand grasp/release of various sized cones, bottles, small wooden cubes, plastic discs, and tongue depressors.  Velcro board using BH to roll large dowel forward/backwards x 8 reps.  Standing WB through left wrist on table vs cone held in hand for pushing towel up/down and circular motions while standing at wall.  Pt to include into HEP.       Time-based treatments/modalities:  Neuromusc re-ed minutes (CPT 27281): 30 minutes      ASSESSMENT:   Response to treatment: Pt making slow but steady progress with his left hand NM recovery in response to facilitory techniques as well as compensatory techniques via kinesiotape to improve functional grasp/release of objects.  Thumb extension/abduction remains 0/5 strength.  Updated HEP with good understanding.    PLAN/RECOMMENDATIONS:   Plan for treatment: therapy treatment to continue next visit.  Planned interventions for next visit: continue with current treatment, E-stim attended (CPT " 04340), neuromuscular re-education (CPT 55308), therapeutic activities (CPT 10104) and therapeutic exercise (CPT 72618)

## 2018-09-11 ENCOUNTER — APPOINTMENT (OUTPATIENT)
Dept: PHYSICAL THERAPY | Facility: REHABILITATION | Age: 48
End: 2018-09-11
Attending: PHYSICIAN ASSISTANT
Payer: MEDICARE

## 2018-09-11 ENCOUNTER — OCCUPATIONAL THERAPY (OUTPATIENT)
Dept: OCCUPATIONAL THERAPY | Facility: REHABILITATION | Age: 48
End: 2018-09-11
Attending: PHYSICIAN ASSISTANT
Payer: MEDICARE

## 2018-09-11 DIAGNOSIS — I61.9 RIGHT-SIDED NONTRAUMATIC INTRACEREBRAL HEMORRHAGE, UNSPECIFIED CEREBRAL LOCATION (HCC): ICD-10-CM

## 2018-09-11 DIAGNOSIS — I67.9 HEMIPARESIS OF LEFT NONDOMINANT SIDE DUE TO CEREBROVASCULAR DISEASE (HCC): ICD-10-CM

## 2018-09-11 DIAGNOSIS — G81.94 HEMIPARESIS OF LEFT NONDOMINANT SIDE DUE TO CEREBROVASCULAR DISEASE (HCC): ICD-10-CM

## 2018-09-11 PROCEDURE — 97112 NEUROMUSCULAR REEDUCATION: CPT

## 2018-09-11 NOTE — OP THERAPY DAILY TREATMENT
"  Outpatient Occupational Therapy  DAILY TREATMENT     [facility] Occupational Therapy [address].  [address]  [place] [address]  Phone:  [phone]  Fax:  [phone]    Date: 09/11/2018    Patient: Lamont Delcid  YOB: 1970  MRN: [id number]     Time Calculation  Start time: 0300  Stop time: 0400 Time Calculation (min): 60 minutes     Chief Complaint: Hand Weakness (left)    Visit #: 11    SUBJECTIVE: \"I feel good\"    OBJECTIVE:  Current objective measures:   Left hand strength: digit extension: 2/5 except thumb 2-/5, digit flexion 3/5 with tonal influence, wrist extension 3/5  Spasticity: Digit flexors: Grade 1 except thumb 1+, thumb adductors 1+, Wrist flexors: Grade 1        Therapeutic Treatments and Modalities:    1. Neuromuscular Re-education (CPT 73838)    Therapeutic Treatments and Modalities Summary: STM left volar forearm for release of soft tissue adhesione with and without Guasha, passive sustained stretch left wrist/digits into full extension, STM to thenar eminence for full stretch of flexors.  Attempts at gross digit extension with thumb held in abduction for inhibitory purposes from a relaxed state with fair results.  Placed 1.5 inch tubigrip sleeve over digits 2-5 for performance of gross extension when raised into 150 degrees of shoulder ff, repeated with PNF diagonals D1/D2 patterns x 15 reps.  Able to achieve full gross extension with hand/wrist sleeve on along with maintaining thumb in neutral.  Repeated D1 pattern without sleeve, able to reproduce active thumb extension.  HEP updated with good understanding. Replaced thumb strap on orthosis secondary to wear and tear.    Time-based treatments/modalities:  Neuromusc re-ed minutes (CPT 07896): 60 minutes      ASSESSMENT:   Response to treatment:  Pt with an excellent response to placement of tubigrip sleeve over left hand/wrist for full gross extension when performing PNF diagonals in raised shoulder " position while being able to maintain thumb in neutral position rather than baseline fully  flexed spastic position.  Pt able to demonstrate new reproducible volitional activation of thumb extensors following above interventions.  Pt also demonstrating an increase in wrist extension.  HEP updated with good understanding.    PLAN/RECOMMENDATIONS:   Plan for treatment: therapy treatment to continue next visit.  Planned interventions for next visit: continue with current treatment, E-stim attended (CPT 82218), manual therapy (CPT 65205), neuromuscular re-education (CPT 72230) and therapeutic activities (CPT 43328)

## 2018-09-13 ENCOUNTER — APPOINTMENT (OUTPATIENT)
Dept: OCCUPATIONAL THERAPY | Facility: REHABILITATION | Age: 48
End: 2018-09-13
Attending: PHYSICIAN ASSISTANT
Payer: MEDICARE

## 2018-09-13 ENCOUNTER — APPOINTMENT (OUTPATIENT)
Dept: PHYSICAL THERAPY | Facility: REHABILITATION | Age: 48
End: 2018-09-13
Attending: PHYSICIAN ASSISTANT
Payer: MEDICARE

## 2018-09-17 ENCOUNTER — APPOINTMENT (OUTPATIENT)
Dept: PHYSICAL THERAPY | Facility: REHABILITATION | Age: 48
End: 2018-09-17
Attending: PHYSICIAN ASSISTANT
Payer: MEDICARE

## 2018-09-17 ENCOUNTER — OCCUPATIONAL THERAPY (OUTPATIENT)
Dept: OCCUPATIONAL THERAPY | Facility: REHABILITATION | Age: 48
End: 2018-09-17
Attending: PHYSICIAN ASSISTANT
Payer: MEDICARE

## 2018-09-17 DIAGNOSIS — G81.94 HEMIPARESIS OF LEFT NONDOMINANT SIDE DUE TO CEREBROVASCULAR DISEASE (HCC): ICD-10-CM

## 2018-09-17 DIAGNOSIS — I67.9 HEMIPARESIS OF LEFT NONDOMINANT SIDE DUE TO CEREBROVASCULAR DISEASE (HCC): ICD-10-CM

## 2018-09-17 DIAGNOSIS — I61.9 RIGHT-SIDED NONTRAUMATIC INTRACEREBRAL HEMORRHAGE, UNSPECIFIED CEREBRAL LOCATION (HCC): ICD-10-CM

## 2018-09-17 PROCEDURE — 97112 NEUROMUSCULAR REEDUCATION: CPT

## 2018-09-17 NOTE — OP THERAPY DAILY TREATMENT
"  Outpatient Occupational Therapy  DAILY TREATMENT     [facility] Occupational Therapy [address].  [address]  [place] [address]  Phone:  [phone]  Fax:  [phone]    Date: 09/17/2018    Patient: Lamont Delcid  YOB: 1970  MRN: [id number]     Time Calculation  Start time: 0300  Stop time: 0400 Time Calculation (min): 60 minutes     Chief Complaint: Extremity Weakness (left)    Visit #: 12    SUBJECTIVE: \"My hand is less tight\"    OBJECTIVE:  Current objective measures:   Left hand strength: digit extension: 2+/5 except thumb 2-/5, digit flexion 3/5 with tonal influence, wrist extension 3/5  Spasticity: Digit flexors: Grade 1 except thumb 1+, thumb adductors 1, Wrist flexors: Grade 1        Therapeutic Treatments and Modalities:    1. Neuromuscular Re-education (CPT 93911)    Therapeutic Treatments and Modalities Summary: STM left volar-medial forearm for release of soft tissue adhesions with and without Guasha, passive sustained stretch left wrist/digits into full extension and abduction, STM to thenar eminence for full stretch of flexors and web space.  Sequence of left shoulder abduction at 120 degrees combined with gross digit extension, return to midline table for WB x 5 seconds, repeat to initial position x 20 reps.   Placed 1.5 inch tubigrip sleeve over digits 2-5 for performance of gross extension when raised into 150 degrees of shoulder abduction for PNF diagonal D1 x 15 reps, shoulder abducted to 90 with forearm in neutral for attempts at gross digit extension, successful with digits 2-5 but without thumb extension.  Left thumb joint approximation with pressure over flexor tendons for attempts at thumb extension vs tactile stimulation to thumb extensors.  HEP updated with good understanding.  Replaced thumb strap and issued new tubigrip sleeve      Time-based treatments/modalities:  Neuromusc re-ed minutes (CPT 69293): 60 minutes        ASSESSMENT:   Response to " treatment: Pt making slow but steady gains today with left hand strength and motor control in response to NM re-ed described above incorporating both facilitory and inhibitory techniques with an increase in duration of extension of digits 2-5 with decreased influence of spasticity.  Thumb extension remains limited due to hemipareisis and flexor spasticity.  Pt may benefit from Botox injections to thumb flexors.  HEP updated with good understanding.    PLAN/RECOMMENDATIONS:   Plan for treatment: therapy treatment to continue next visit.  Planned interventions for next visit: continue with current treatment, E-stim attended (CPT 27010), functional training/self care (CPT 88668), neuromuscular re-education (CPT 35494), orthotic training (CPT 01338) and therapeutic activities (CPT 07561)

## 2018-09-24 ENCOUNTER — OCCUPATIONAL THERAPY (OUTPATIENT)
Dept: OCCUPATIONAL THERAPY | Facility: REHABILITATION | Age: 48
End: 2018-09-24
Attending: PHYSICIAN ASSISTANT
Payer: MEDICARE

## 2018-09-24 ENCOUNTER — PHYSICAL THERAPY (OUTPATIENT)
Dept: PHYSICAL THERAPY | Facility: REHABILITATION | Age: 48
End: 2018-09-24
Attending: PHYSICIAN ASSISTANT
Payer: MEDICARE

## 2018-09-24 DIAGNOSIS — I67.9 HEMIPARESIS OF LEFT NONDOMINANT SIDE DUE TO CEREBROVASCULAR DISEASE (HCC): ICD-10-CM

## 2018-09-24 DIAGNOSIS — G81.94 HEMIPARESIS OF LEFT NONDOMINANT SIDE DUE TO CEREBROVASCULAR DISEASE (HCC): ICD-10-CM

## 2018-09-24 DIAGNOSIS — I61.9 RIGHT-SIDED NONTRAUMATIC INTRACEREBRAL HEMORRHAGE, UNSPECIFIED CEREBRAL LOCATION (HCC): ICD-10-CM

## 2018-09-24 DIAGNOSIS — G81.94 LEFT HEMIPARESIS (HCC): ICD-10-CM

## 2018-09-24 PROCEDURE — 97763 ORTHC/PROSTC MGMT SBSQ ENC: CPT

## 2018-09-24 PROCEDURE — 97032 APPL MODALITY 1+ESTIM EA 15: CPT

## 2018-09-24 PROCEDURE — 97110 THERAPEUTIC EXERCISES: CPT

## 2018-09-24 PROCEDURE — 97112 NEUROMUSCULAR REEDUCATION: CPT

## 2018-09-24 PROCEDURE — 97530 THERAPEUTIC ACTIVITIES: CPT

## 2018-09-24 NOTE — OP THERAPY DAILY TREATMENT
Outpatient Physical Therapy  DAILY TREATMENT     [facility] Physical Therapy [address].  [address]  [place] [address]  Phone:  [phone]  Fax:  [phone]    Date: 09/24/2018    Patient: Lamont Delcid  YOB: 1970  MRN: [id number]     Time Calculation  Start time: 1030  Stop time: 1100 Time Calculation (min): 30 minutes     Chief Complaint: Loss Of Balance and Difficulty Walking    Visit #: 12    SUBJECTIVE:  Pt slowly improving, continues to report imbalance w/prolonged walking/activity    OBJECTIVE:      Therapeutic Exercises (CPT 59964):     1. Nu Step ,  x10 min, L5-8, w/speed intervals    2. Lateral over BOSU, x10 ea    3. Clock cone taps    4. Wall ball squats, x30      Time-based treatments/modalities:  Therapeutic exercise minutes (CPT 51852): 30 minutes       ASSESSMENT:   Pt w/improved control of hip/LE position. Challenged most by NBOS      PLAN/RECOMMENDATIONS:   Cont PT x2 visits for rotational/trunk control, LE strength, balance.

## 2018-09-24 NOTE — OP THERAPY DAILY TREATMENT
"  Outpatient Occupational Therapy  DAILY TREATMENT     [facility] Occupational [address]  [address]  [place] [address]  Phone:  [phone]  Fax:  [phone]    Date: 09/24/2018    Patient: Lamont Delcid  YOB: 1970  MRN: [id number]     Time Calculation  Start time: 0900  Stop time: 1000 Time Calculation (min): 60 minutes     Chief Complaint: Extremity Weakness (left)    Visit #: 13    SUBJECTIVE: \"My left hand feels loose\"    OBJECTIVE:  Current objective measures:   Left hand strength: digit extension: 2+/5 except thumb 2-/5, digit flexion 3/5 with tonal influence, wrist extension 3/5  Spasticity: Digit flexors: Grade 1 except thumb 1+, thumb adductors 1, Wrist flexors: Grade 1        Therapeutic Treatments and Modalities:    1. E Stim Attended (CPT 20128)    2. Neuromuscular Re-education (CPT 93062)    3. Orthotic Training (CPT 35957)    4. Therapeutic Activities (CPT 05390)    Therapeutic Treatments and Modalities Summary: NMES 42 Hz left mid-distal dorsal forearm x 15 minutes for focus on wrist/digit extension and thumb incorporating active attempts at extension in conjunction with stimulation.  STM left thumb flexors, surgitube place over left thumb to assist with IP extension for PNF diagonals D1 pattern x 20.  Coban wrapped over left thumb and anchored at wrist to facilitate a functional grasping of various sized/shapes and place in bucket.  Fabricated 1/2 inch velfoam strap anchored at wrist similar as above with thumb held in abduction/extension to improved ability to functionally grasp/release of objects above.    Time-based treatments/modalities:  Therapeutic activity minutes (CPT 75628): 15 minutes  Orthotic/Prosthetic subsq visit, minutes (CPT 00517): 15 minutes  Neuromusc re-ed minutes (CPT 43932): 15 minutes  E-stim attended minutes (CPT 39197): 15 minutes     ASSESSMENT:   Response to treatment: Pt making good progress today with the use of " compensatory techniques via extension strap to improve ability to functionally grasp and release various sized objects using LH with decreased influence of spasticity.  Instructed pt to wear strap during the day instead of the hand splint, which can be worn at night.  Pt verbalized understanding of wear schedule.    PLAN/RECOMMENDATIONS:   Plan for treatment: therapy treatment to continue next visit.  Planned interventions for next visit: continue with current treatment, neuromuscular re-education (CPT 66522), orthotic training (CPT 62944), therapeutic activities (CPT 36899) and therapeutic exercise (CPT 15719)

## 2018-10-03 ENCOUNTER — OCCUPATIONAL THERAPY (OUTPATIENT)
Dept: OCCUPATIONAL THERAPY | Facility: REHABILITATION | Age: 48
End: 2018-10-03
Attending: PHYSICIAN ASSISTANT
Payer: MEDICARE

## 2018-10-03 DIAGNOSIS — I61.9 RIGHT-SIDED NONTRAUMATIC INTRACEREBRAL HEMORRHAGE, UNSPECIFIED CEREBRAL LOCATION (HCC): ICD-10-CM

## 2018-10-03 DIAGNOSIS — I67.9 HEMIPARESIS OF LEFT NONDOMINANT SIDE DUE TO CEREBROVASCULAR DISEASE (HCC): ICD-10-CM

## 2018-10-03 DIAGNOSIS — G81.94 HEMIPARESIS OF LEFT NONDOMINANT SIDE DUE TO CEREBROVASCULAR DISEASE (HCC): ICD-10-CM

## 2018-10-03 PROCEDURE — 97032 APPL MODALITY 1+ESTIM EA 15: CPT

## 2018-10-03 PROCEDURE — 97112 NEUROMUSCULAR REEDUCATION: CPT

## 2018-10-03 NOTE — OP THERAPY DAILY TREATMENT
"  Outpatient Occupational Therapy  DAILY TREATMENT     [facility] Occupational [address].  [address]  [place] [address]  Phone:  [phone]  Fax:  [phone]    Date: 10/03/2018    Patient: Lamont Delcid  YOB: 1970  MRN: [id number]     Time Calculation  Start time: 0930  Stop time: 1030 Time Calculation (min): 60 minutes     Chief Complaint: Extremity Weakness (left)    Visit #: 14    SUBJECTIVE: \"The problem is my thumb\"    OBJECTIVE:  Current objective measures:   Left hand strength: digit extension: 2+/5 except thumb 2-/5, digit flexion 3/5 with tonal influence, wrist extension 3/5  Spasticity: Digit flexors: Grade 1+ except thumb flexion 1+,   thumb adductors 1+, Wrist flexors: Grade 1        Therapeutic Treatments and Modalities:    1. E Stim Attended (CPT 29097)    2. Neuromuscular Re-education (CPT 68574)    Therapeutic Treatments and Modalities Summary: NMES 42 Hz left mid-dorsal forearm (active) and distal radial forearm (indifferent) x 15 minutes for focus on thumb extension/abduction combined with attempts at active extension of all digits during stimulation.  Mirror Box therapy initiated with focus on digit/wrist flexion/extension and forearm supination/pronation.  Performed various sequences with active RH movement only vs LH digits held in extension during RH movement vs LH passively moved simultaneously and attempts at simultaneous movement.   Repeated attempts at simultaneous movements without Mirror Box without success as flexor spasticity interferes.  Supine on mat for passive PNF diagonals D1 pattern with active digit extension when from initiated position with fair response.       Time-based treatments/modalities:  Neuromusc re-ed minutes (CPT 56533): 45 minutes  E-stim attended minutes (CPT 17229): 15 minutes      ASSESSMENT:   Response to treatment:  Pt making slow progress today with his LH neuromuscular recovery in response to facilitory and " inhibitory techniques to improve digit extension for functional grasp and release.  Pt's digit flexor tone has increased during attempts at extension and abduction with consequential decreased active movement.  Attempted to contact his PA to discuss possible pharmacological interventions to reduce spasticity.  PA was not available.  Will re-attempt this week.  Continue with HEP and positioning.    PLAN/RECOMMENDATIONS:   Plan for treatment: therapy treatment to continue next visit.  Planned interventions for next visit: continue with current treatment, E-stim attended (CPT 37448), neuromuscular re-education (CPT 53175), orthotic training (CPT 27863) and therapeutic activities (CPT 74554)

## 2018-10-05 ENCOUNTER — APPOINTMENT (OUTPATIENT)
Dept: PHYSICAL THERAPY | Facility: REHABILITATION | Age: 48
End: 2018-10-05
Attending: PHYSICIAN ASSISTANT
Payer: MEDICARE

## 2018-10-09 ENCOUNTER — PHYSICAL THERAPY (OUTPATIENT)
Dept: PHYSICAL THERAPY | Facility: REHABILITATION | Age: 48
End: 2018-10-09
Attending: PHYSICIAN ASSISTANT
Payer: MEDICARE

## 2018-10-09 ENCOUNTER — OCCUPATIONAL THERAPY (OUTPATIENT)
Dept: OCCUPATIONAL THERAPY | Facility: REHABILITATION | Age: 48
End: 2018-10-09
Attending: PHYSICIAN ASSISTANT
Payer: MEDICARE

## 2018-10-09 DIAGNOSIS — G81.94 HEMIPARESIS OF LEFT NONDOMINANT SIDE DUE TO CEREBROVASCULAR DISEASE (HCC): ICD-10-CM

## 2018-10-09 DIAGNOSIS — I67.9 HEMIPARESIS OF LEFT NONDOMINANT SIDE DUE TO CEREBROVASCULAR DISEASE (HCC): ICD-10-CM

## 2018-10-09 DIAGNOSIS — I61.9 RIGHT-SIDED NONTRAUMATIC INTRACEREBRAL HEMORRHAGE, UNSPECIFIED CEREBRAL LOCATION (HCC): ICD-10-CM

## 2018-10-09 DIAGNOSIS — G81.94 LEFT HEMIPARESIS (HCC): ICD-10-CM

## 2018-10-09 PROCEDURE — 97032 APPL MODALITY 1+ESTIM EA 15: CPT

## 2018-10-09 PROCEDURE — 97112 NEUROMUSCULAR REEDUCATION: CPT

## 2018-10-09 PROCEDURE — 97110 THERAPEUTIC EXERCISES: CPT

## 2018-10-09 NOTE — OP THERAPY DAILY TREATMENT
"  Outpatient Occupational Therapy  DAILY TREATMENT     [facility] Occupational [address].  [address]  [place] [address]  Phone:  [phone]  Fax:  [phone]    Date: 10/09/2018    Patient: Lamont Delcid  YOB: 1970  MRN: [id number]     Time Calculation  Start time: 0100  Stop time: 0200 Time Calculation (min): 60 minutes     Chief Complaint: Hand Weakness (left weakness, spasticity)    Visit #: 15    SUBJECTIVE: \"I will get the medicine and try it\"    OBJECTIVE:  Current objective measures:   Left hand strength: digit extension: 2+/5 except thumb 2-/5, digit flexion 3/5 with tonal influence, wrist extension 3/5  Spasticity: Digit flexors: Grade 2   thumb adductors 1+, Wrist flexors: Grade 1        Therapeutic Treatments and Modalities:    1. E Stim Attended (CPT 13355)    2. Neuromuscular Re-education (CPT 19638)    Therapeutic Treatments and Modalities Summary: NMES at 40 Hz for left thumb extension with positive result (active over dorsal thumb between CMC and MCP joint, indifferent mid-dorsal forearm.  Mirror Box therapy initiated with focus on digit/wrist flexion/extension, opposition thumb to IF, isolated thumb flexion/extension,activities such as placing/removing/stacking poker chips into bowl 1 at a time, and grasping/releasing bottle combined with moving location of bottle.  LH passively moved simultaneously with RH into gross flexion/extension and isolated thumb flexion/extension  Active simultaneous movements flexion-relaxation-extension sequence requiring manual assistance for extension, repeated without Mirror Box bilateral hands for extension from relaxed position with left thumb held in abduction with fair success, repeated for isolated thumb extension without success due to strong influence of flexor spasticity    Time-based treatments/modalities:  Neuromusc re-ed minutes (CPT 56884): 45 minutes  E-stim attended minutes (CPT 40016): 15 minutes  "     ASSESSMENT:   Response to treatment:  Pt making slow gains today in his LUE neuromuscular recovery in response to facilitory and inhibitory techniques described above due to persistent flexor spasticity and hemipareisis.  Informed pt of discussion I had with his PA regarding anti-spasticity medication for which she prescribed Baclofen.  Pt encouraged to take this medication as he initially wasn't interested, but was in agreement after explanation of benefits.  Continue with HEP to include mirror box.    PLAN/RECOMMENDATIONS:   Plan for treatment: therapy treatment to continue next visit.  Planned interventions for next visit: continue with current treatment, E-stim attended (CPT 09205), functional training/self care (CPT 94006) and neuromuscular re-education (CPT 03737)

## 2018-10-09 NOTE — OP THERAPY DAILY TREATMENT
Outpatient Physical Therapy  DAILY TREATMENT     [facility] Physical Therapy [address]  [address]  [place] [address]  Phone:  [phone]  Fax:  [phone]    Date: 10/09/2018    Patient: Lamont Delcid  YOB: 1970  MRN: [id number]     Time Calculation  Start time: 1430  Stop time: 1515 Time Calculation (min): 45 minutes     Chief Complaint: Loss Of Balance and Weakness    Visit #: 13    SUBJECTIVE:  Pt slowly improving, continues to report imbalance w/prolonged walking/activity    OBJECTIVE:      Therapeutic Exercises (CPT 23316):     1. Nu Step , x10 min, w/speed intervals    2. DL balance on BOSU, x2 min    3. Tandem walk fwd/back on 2x4, 3x10 ft ea    4. Sidestep on 2x4, 3x10 ft ea    5. Ball bridge and curl, x10    6. 4-way mod plank off edge of mat, x10 sec ea, req'd A to support L hand and elbow      Time-based treatments/modalities:  Therapeutic exercise minutes (CPT 73576): 45 minutes       ASSESSMENT:  Pt demo'd improved balance rxs on 2x4.     PLAN/RECOMMENDATIONS:   Cont PT x1 visit after [place] to reassess function and review HEP.

## 2018-10-30 ENCOUNTER — APPOINTMENT (OUTPATIENT)
Dept: OCCUPATIONAL THERAPY | Facility: REHABILITATION | Age: 48
End: 2018-10-30
Attending: PHYSICIAN ASSISTANT
Payer: MEDICARE

## 2018-11-06 ENCOUNTER — APPOINTMENT (OUTPATIENT)
Dept: OCCUPATIONAL THERAPY | Facility: REHABILITATION | Age: 48
End: 2018-11-06
Attending: PHYSICIAN ASSISTANT
Payer: MEDICARE

## 2018-11-08 ENCOUNTER — APPOINTMENT (OUTPATIENT)
Dept: OCCUPATIONAL THERAPY | Facility: REHABILITATION | Age: 48
End: 2018-11-08
Attending: PHYSICIAN ASSISTANT
Payer: MEDICARE

## 2018-11-08 ENCOUNTER — APPOINTMENT (OUTPATIENT)
Dept: PHYSICAL THERAPY | Facility: REHABILITATION | Age: 48
End: 2018-11-08
Attending: PHYSICIAN ASSISTANT
Payer: MEDICARE

## 2018-11-13 ENCOUNTER — APPOINTMENT (OUTPATIENT)
Dept: OCCUPATIONAL THERAPY | Facility: REHABILITATION | Age: 48
End: 2018-11-13
Attending: PHYSICIAN ASSISTANT
Payer: MEDICARE

## 2018-11-13 ENCOUNTER — APPOINTMENT (OUTPATIENT)
Dept: PHYSICAL THERAPY | Facility: REHABILITATION | Age: 48
End: 2018-11-13
Attending: PHYSICIAN ASSISTANT
Payer: MEDICARE

## 2018-11-20 ENCOUNTER — APPOINTMENT (OUTPATIENT)
Dept: OCCUPATIONAL THERAPY | Facility: REHABILITATION | Age: 48
End: 2018-11-20
Attending: PHYSICIAN ASSISTANT
Payer: MEDICARE

## 2018-11-20 ENCOUNTER — TELEPHONE (OUTPATIENT)
Dept: OCCUPATIONAL THERAPY | Facility: REHABILITATION | Age: 48
End: 2018-11-20

## 2018-11-21 NOTE — OP THERAPY DISCHARGE SUMMARY
"  Outpatient Occupational Therapy  DISCHARGE SUMMARY NOTE    [facility] Occupational Therapy [address]  [address]  [place] [address]  Phone:  [phone]  Fax:  [phone]    Date of Visit: 11/20/2018    Patient: Lamont Delcid  YOB: 1970  MRN: [id number]     Referring Provider: Gretchen Edgar P.A.-C.   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  Pt did not show up for his scheduled appointment today and when he was called, he stated that he \"would not be able to proceed with therapy at this time\"     Limitations Remaining:  unknown    Recommendations:  D/C to HEP.  D/C OT.    [place] MS,OTR/L    Date: 11/20/2018  "

## 2018-11-27 ENCOUNTER — APPOINTMENT (OUTPATIENT)
Dept: PHYSICAL THERAPY | Facility: REHABILITATION | Age: 48
End: 2018-11-27
Attending: PHYSICIAN ASSISTANT
Payer: MEDICARE

## 2018-11-27 ENCOUNTER — APPOINTMENT (OUTPATIENT)
Dept: OCCUPATIONAL THERAPY | Facility: REHABILITATION | Age: 48
End: 2018-11-27
Attending: PHYSICIAN ASSISTANT
Payer: MEDICARE

## 2019-02-08 ENCOUNTER — HOSPITAL ENCOUNTER (OUTPATIENT)
Dept: LAB | Facility: MEDICAL CENTER | Age: 49
End: 2019-02-08
Attending: PHYSICIAN ASSISTANT
Payer: MEDICARE

## 2019-02-08 LAB
ALBUMIN SERPL BCP-MCNC: 3.4 G/DL (ref 3.2–4.9)
ALBUMIN/GLOB SERPL: 1 G/DL
ALP SERPL-CCNC: 84 U/L (ref 30–99)
ALT SERPL-CCNC: 13 U/L (ref 2–50)
ANION GAP SERPL CALC-SCNC: 8 MMOL/L (ref 0–11.9)
AST SERPL-CCNC: 12 U/L (ref 12–45)
BASOPHILS # BLD AUTO: 0.7 % (ref 0–1.8)
BASOPHILS # BLD: 0.05 K/UL (ref 0–0.12)
BILIRUB SERPL-MCNC: 0.2 MG/DL (ref 0.1–1.5)
BUN SERPL-MCNC: 13 MG/DL (ref 8–22)
CALCIUM SERPL-MCNC: 9 MG/DL (ref 8.5–10.5)
CHLORIDE SERPL-SCNC: 109 MMOL/L (ref 96–112)
CHOLEST SERPL-MCNC: 132 MG/DL (ref 100–199)
CO2 SERPL-SCNC: 24 MMOL/L (ref 20–33)
CREAT SERPL-MCNC: 1.01 MG/DL (ref 0.5–1.4)
EOSINOPHIL # BLD AUTO: 0.2 K/UL (ref 0–0.51)
EOSINOPHIL NFR BLD: 2.9 % (ref 0–6.9)
ERYTHROCYTE [DISTWIDTH] IN BLOOD BY AUTOMATED COUNT: 46.8 FL (ref 35.9–50)
GLOBULIN SER CALC-MCNC: 3.4 G/DL (ref 1.9–3.5)
GLUCOSE SERPL-MCNC: 104 MG/DL (ref 65–99)
HCT VFR BLD AUTO: 40.8 % (ref 42–52)
HDLC SERPL-MCNC: 32 MG/DL
HGB BLD-MCNC: 13.8 G/DL (ref 14–18)
IMM GRANULOCYTES # BLD AUTO: 0.02 K/UL (ref 0–0.11)
IMM GRANULOCYTES NFR BLD AUTO: 0.3 % (ref 0–0.9)
LDLC SERPL CALC-MCNC: 82 MG/DL
LYMPHOCYTES # BLD AUTO: 1.3 K/UL (ref 1–4.8)
LYMPHOCYTES NFR BLD: 18.7 % (ref 22–41)
MCH RBC QN AUTO: 29.2 PG (ref 27–33)
MCHC RBC AUTO-ENTMCNC: 33.8 G/DL (ref 33.7–35.3)
MCV RBC AUTO: 86.3 FL (ref 81.4–97.8)
MONOCYTES # BLD AUTO: 0.49 K/UL (ref 0–0.85)
MONOCYTES NFR BLD AUTO: 7.1 % (ref 0–13.4)
NEUTROPHILS # BLD AUTO: 4.89 K/UL (ref 1.82–7.42)
NEUTROPHILS NFR BLD: 70.3 % (ref 44–72)
NRBC # BLD AUTO: 0 K/UL
NRBC BLD-RTO: 0 /100 WBC
PLATELET # BLD AUTO: 106 K/UL (ref 164–446)
PMV BLD AUTO: 12.2 FL (ref 9–12.9)
POTASSIUM SERPL-SCNC: 4 MMOL/L (ref 3.6–5.5)
PROT SERPL-MCNC: 6.8 G/DL (ref 6–8.2)
RBC # BLD AUTO: 4.73 M/UL (ref 4.7–6.1)
SODIUM SERPL-SCNC: 141 MMOL/L (ref 135–145)
TRIGL SERPL-MCNC: 88 MG/DL (ref 0–149)
TSH SERPL DL<=0.005 MIU/L-ACNC: 3.2 UIU/ML (ref 0.38–5.33)
WBC # BLD AUTO: 7 K/UL (ref 4.8–10.8)

## 2019-02-08 PROCEDURE — 85025 COMPLETE CBC W/AUTO DIFF WBC: CPT

## 2019-02-08 PROCEDURE — 80061 LIPID PANEL: CPT

## 2019-02-08 PROCEDURE — 83036 HEMOGLOBIN GLYCOSYLATED A1C: CPT

## 2019-02-08 PROCEDURE — 80053 COMPREHEN METABOLIC PANEL: CPT

## 2019-02-08 PROCEDURE — 84443 ASSAY THYROID STIM HORMONE: CPT

## 2019-02-09 LAB
EST. AVERAGE GLUCOSE BLD GHB EST-MCNC: 114 MG/DL
HBA1C MFR BLD: 5.6 % (ref 0–5.6)

## 2020-04-02 ENCOUNTER — TELEPHONE (OUTPATIENT)
Dept: PHYSICAL THERAPY | Facility: REHABILITATION | Age: 50
End: 2020-04-02

## 2020-04-02 NOTE — OP THERAPY DISCHARGE SUMMARY
Outpatient Physical Therapy  DISCHARGE SUMMARY NOTE      [facility] Therapy [address].  [address]  [place] [address]  Phone:  [phone]  Fax:  [phone]    Date of Visit: 04/02/2020    Patient: Lamont Delcid  YOB: 1970  MRN: [id number]     Referring Provider: No referring provider defined for this encounter.   Referring Diagnosis No admission diagnoses are documented for this encounter.     Physical Therapy Occurrence Codes    Date of onset of impairment:  6/18/15   Date physical therapy care plan established or reviewed:  6/18/18   Date physical therapy treatment started:  6/18/18          Functional Assessment Used        Your patient is being discharged from Physical Therapy with the following comments:   · Patient was seen for skilled physical therapy following CVA between 6/18/18-10/9/18. Patient did not return for follow up treatment and appropriate DC paperwork was not completed. Providing PT is a PR[employer] therapist and is rarely in the clinic so DC paperwork completed on their behalf.    Little Beasley, PT, DPT    Date: 4/2/2020

## 2020-09-22 ENCOUNTER — HOSPITAL ENCOUNTER (OUTPATIENT)
Facility: MEDICAL CENTER | Age: 50
End: 2020-09-22
Payer: MEDICARE

## 2020-10-22 ENCOUNTER — APPOINTMENT (OUTPATIENT)
Dept: RADIOLOGY | Facility: MEDICAL CENTER | Age: 50
DRG: 871 | End: 2020-10-22
Attending: EMERGENCY MEDICINE
Payer: MEDICARE

## 2020-10-22 ENCOUNTER — HOSPITAL ENCOUNTER (INPATIENT)
Facility: MEDICAL CENTER | Age: 50
LOS: 4 days | DRG: 871 | End: 2020-10-26
Attending: EMERGENCY MEDICINE | Admitting: STUDENT IN AN ORGANIZED HEALTH CARE EDUCATION/TRAINING PROGRAM
Payer: MEDICARE

## 2020-10-22 DIAGNOSIS — J96.01 ACUTE RESPIRATORY FAILURE WITH HYPOXIA (HCC): ICD-10-CM

## 2020-10-22 DIAGNOSIS — A41.9 SEPSIS, DUE TO UNSPECIFIED ORGANISM, UNSPECIFIED WHETHER ACUTE ORGAN DYSFUNCTION PRESENT (HCC): ICD-10-CM

## 2020-10-22 DIAGNOSIS — J18.9 PNEUMONIA OF BOTH LUNGS DUE TO INFECTIOUS ORGANISM, UNSPECIFIED PART OF LUNG: ICD-10-CM

## 2020-10-22 PROBLEM — Z20.822 SUSPECTED COVID-19 VIRUS INFECTION: Status: ACTIVE | Noted: 2020-10-22

## 2020-10-22 PROBLEM — I10 HTN (HYPERTENSION): Status: ACTIVE | Noted: 2020-10-22

## 2020-10-22 PROBLEM — Z86.73 H/O: CVA (CEREBROVASCULAR ACCIDENT): Status: ACTIVE | Noted: 2020-10-22

## 2020-10-22 LAB
ALBUMIN SERPL BCP-MCNC: 3.5 G/DL (ref 3.2–4.9)
ALBUMIN/GLOB SERPL: 0.9 G/DL
ALP SERPL-CCNC: 103 U/L (ref 30–99)
ALT SERPL-CCNC: 14 U/L (ref 2–50)
ANION GAP SERPL CALC-SCNC: 20 MMOL/L (ref 7–16)
APPEARANCE UR: CLEAR
AST SERPL-CCNC: 16 U/L (ref 12–45)
BACTERIA #/AREA URNS HPF: NEGATIVE /HPF
BASOPHILS # BLD AUTO: 0.3 % (ref 0–1.8)
BASOPHILS # BLD: 0.04 K/UL (ref 0–0.12)
BILIRUB SERPL-MCNC: 0.7 MG/DL (ref 0.1–1.5)
BILIRUB UR QL STRIP.AUTO: NEGATIVE
BUN SERPL-MCNC: 17 MG/DL (ref 8–22)
CALCIUM SERPL-MCNC: 8.9 MG/DL (ref 8.5–10.5)
CHLORIDE SERPL-SCNC: 99 MMOL/L (ref 96–112)
CO2 SERPL-SCNC: 16 MMOL/L (ref 20–33)
COLOR UR: YELLOW
COVID ORDER STATUS COVID19: NORMAL
CREAT SERPL-MCNC: 1.44 MG/DL (ref 0.5–1.4)
D DIMER PPP IA.FEU-MCNC: 2.23 UG/ML (FEU) (ref 0–0.5)
EKG IMPRESSION: NORMAL
EOSINOPHIL # BLD AUTO: 0.04 K/UL (ref 0–0.51)
EOSINOPHIL NFR BLD: 0.3 % (ref 0–6.9)
EPI CELLS #/AREA URNS HPF: NEGATIVE /HPF
ERYTHROCYTE [DISTWIDTH] IN BLOOD BY AUTOMATED COUNT: 41.5 FL (ref 35.9–50)
GLOBULIN SER CALC-MCNC: 4 G/DL (ref 1.9–3.5)
GLUCOSE SERPL-MCNC: 104 MG/DL (ref 65–99)
GLUCOSE UR STRIP.AUTO-MCNC: NEGATIVE MG/DL
HCT VFR BLD AUTO: 39.7 % (ref 42–52)
HGB BLD-MCNC: 13.8 G/DL (ref 14–18)
HYALINE CASTS #/AREA URNS LPF: ABNORMAL /LPF
IMM GRANULOCYTES # BLD AUTO: 0.05 K/UL (ref 0–0.11)
IMM GRANULOCYTES NFR BLD AUTO: 0.4 % (ref 0–0.9)
KETONES UR STRIP.AUTO-MCNC: NEGATIVE MG/DL
LACTATE BLD-SCNC: 1.8 MMOL/L (ref 0.5–2)
LACTATE BLD-SCNC: 3.7 MMOL/L (ref 0.5–2)
LEUKOCYTE ESTERASE UR QL STRIP.AUTO: NEGATIVE
LYMPHOCYTES # BLD AUTO: 1.11 K/UL (ref 1–4.8)
LYMPHOCYTES NFR BLD: 8.6 % (ref 22–41)
MCH RBC QN AUTO: 28.4 PG (ref 27–33)
MCHC RBC AUTO-ENTMCNC: 34.8 G/DL (ref 33.7–35.3)
MCV RBC AUTO: 81.7 FL (ref 81.4–97.8)
MICRO URNS: ABNORMAL
MONOCYTES # BLD AUTO: 0.81 K/UL (ref 0–0.85)
MONOCYTES NFR BLD AUTO: 6.2 % (ref 0–13.4)
NEUTROPHILS # BLD AUTO: 10.92 K/UL (ref 1.82–7.42)
NEUTROPHILS NFR BLD: 84.2 % (ref 44–72)
NITRITE UR QL STRIP.AUTO: NEGATIVE
NRBC # BLD AUTO: 0 K/UL
NRBC BLD-RTO: 0 /100 WBC
PH UR STRIP.AUTO: 5.5 [PH] (ref 5–8)
PLATELET # BLD AUTO: 146 K/UL (ref 164–446)
PMV BLD AUTO: 12.2 FL (ref 9–12.9)
POTASSIUM SERPL-SCNC: 3.7 MMOL/L (ref 3.6–5.5)
PROCALCITONIN SERPL-MCNC: 1 NG/ML
PROT SERPL-MCNC: 7.5 G/DL (ref 6–8.2)
PROT UR QL STRIP: 300 MG/DL
RBC # BLD AUTO: 4.86 M/UL (ref 4.7–6.1)
RBC # URNS HPF: ABNORMAL /HPF
RBC UR QL AUTO: ABNORMAL
SARS-COV-2 RNA RESP QL NAA+PROBE: NOTDETECTED
SODIUM SERPL-SCNC: 135 MMOL/L (ref 135–145)
SP GR UR STRIP.AUTO: 1.01
SPECIMEN SOURCE: NORMAL
UROBILINOGEN UR STRIP.AUTO-MCNC: 0.2 MG/DL
WBC # BLD AUTO: 13 K/UL (ref 4.8–10.8)
WBC #/AREA URNS HPF: ABNORMAL /HPF

## 2020-10-22 PROCEDURE — 700105 HCHG RX REV CODE 258: Performed by: EMERGENCY MEDICINE

## 2020-10-22 PROCEDURE — 81001 URINALYSIS AUTO W/SCOPE: CPT

## 2020-10-22 PROCEDURE — 87040 BLOOD CULTURE FOR BACTERIA: CPT | Mod: 91

## 2020-10-22 PROCEDURE — 99285 EMERGENCY DEPT VISIT HI MDM: CPT

## 2020-10-22 PROCEDURE — 71045 X-RAY EXAM CHEST 1 VIEW: CPT

## 2020-10-22 PROCEDURE — 770020 HCHG ROOM/CARE - TELE (206)

## 2020-10-22 PROCEDURE — 87086 URINE CULTURE/COLONY COUNT: CPT

## 2020-10-22 PROCEDURE — 5A0945A ASSISTANCE WITH RESPIRATORY VENTILATION, 24-96 CONSECUTIVE HOURS, HIGH NASAL FLOW/VELOCITY: ICD-10-PCS | Performed by: INTERNAL MEDICINE

## 2020-10-22 PROCEDURE — 85025 COMPLETE CBC W/AUTO DIFF WBC: CPT

## 2020-10-22 PROCEDURE — 94640 AIRWAY INHALATION TREATMENT: CPT

## 2020-10-22 PROCEDURE — 93005 ELECTROCARDIOGRAM TRACING: CPT

## 2020-10-22 PROCEDURE — 80053 COMPREHEN METABOLIC PANEL: CPT

## 2020-10-22 PROCEDURE — U0003 INFECTIOUS AGENT DETECTION BY NUCLEIC ACID (DNA OR RNA); SEVERE ACUTE RESPIRATORY SYNDROME CORONAVIRUS 2 (SARS-COV-2) (CORONAVIRUS DISEASE [COVID-19]), AMPLIFIED PROBE TECHNIQUE, MAKING USE OF HIGH THROUGHPUT TECHNOLOGIES AS DESCRIBED BY CMS-2020-01-R: HCPCS

## 2020-10-22 PROCEDURE — 700111 HCHG RX REV CODE 636 W/ 250 OVERRIDE (IP): Performed by: EMERGENCY MEDICINE

## 2020-10-22 PROCEDURE — C9803 HOPD COVID-19 SPEC COLLECT: HCPCS | Performed by: EMERGENCY MEDICINE

## 2020-10-22 PROCEDURE — A9270 NON-COVERED ITEM OR SERVICE: HCPCS | Performed by: STUDENT IN AN ORGANIZED HEALTH CARE EDUCATION/TRAINING PROGRAM

## 2020-10-22 PROCEDURE — 36415 COLL VENOUS BLD VENIPUNCTURE: CPT

## 2020-10-22 PROCEDURE — 99223 1ST HOSP IP/OBS HIGH 75: CPT | Mod: AI | Performed by: STUDENT IN AN ORGANIZED HEALTH CARE EDUCATION/TRAINING PROGRAM

## 2020-10-22 PROCEDURE — 96365 THER/PROPH/DIAG IV INF INIT: CPT

## 2020-10-22 PROCEDURE — 84145 PROCALCITONIN (PCT): CPT

## 2020-10-22 PROCEDURE — 96368 THER/DIAG CONCURRENT INF: CPT

## 2020-10-22 PROCEDURE — 93005 ELECTROCARDIOGRAM TRACING: CPT | Performed by: EMERGENCY MEDICINE

## 2020-10-22 PROCEDURE — 83520 IMMUNOASSAY QUANT NOS NONAB: CPT

## 2020-10-22 PROCEDURE — 86140 C-REACTIVE PROTEIN: CPT

## 2020-10-22 PROCEDURE — 83605 ASSAY OF LACTIC ACID: CPT

## 2020-10-22 PROCEDURE — 700102 HCHG RX REV CODE 250 W/ 637 OVERRIDE(OP): Performed by: STUDENT IN AN ORGANIZED HEALTH CARE EDUCATION/TRAINING PROGRAM

## 2020-10-22 PROCEDURE — 85379 FIBRIN DEGRADATION QUANT: CPT

## 2020-10-22 RX ORDER — POLYETHYLENE GLYCOL 3350 17 G/17G
1 POWDER, FOR SOLUTION ORAL
Status: DISCONTINUED | OUTPATIENT
Start: 2020-10-22 | End: 2020-10-26 | Stop reason: HOSPADM

## 2020-10-22 RX ORDER — DEXAMETHASONE SODIUM PHOSPHATE 4 MG/ML
6 INJECTION, SOLUTION INTRA-ARTICULAR; INTRALESIONAL; INTRAMUSCULAR; INTRAVENOUS; SOFT TISSUE DAILY
Status: DISCONTINUED | OUTPATIENT
Start: 2020-10-23 | End: 2020-10-26 | Stop reason: HOSPADM

## 2020-10-22 RX ORDER — ENALAPRIL MALEATE 20 MG/1
30 TABLET ORAL
COMMUNITY
End: 2021-09-15

## 2020-10-22 RX ORDER — BISACODYL 10 MG
10 SUPPOSITORY, RECTAL RECTAL
Status: DISCONTINUED | OUTPATIENT
Start: 2020-10-22 | End: 2020-10-26 | Stop reason: HOSPADM

## 2020-10-22 RX ORDER — AMOXICILLIN 250 MG
2 CAPSULE ORAL 2 TIMES DAILY
Status: DISCONTINUED | OUTPATIENT
Start: 2020-10-22 | End: 2020-10-26 | Stop reason: HOSPADM

## 2020-10-22 RX ORDER — SODIUM CHLORIDE, SODIUM LACTATE, POTASSIUM CHLORIDE, AND CALCIUM CHLORIDE .6; .31; .03; .02 G/100ML; G/100ML; G/100ML; G/100ML
30 INJECTION, SOLUTION INTRAVENOUS
Status: DISCONTINUED | OUTPATIENT
Start: 2020-10-22 | End: 2020-10-26 | Stop reason: HOSPADM

## 2020-10-22 RX ORDER — LISINOPRIL 10 MG/1
TABLET ORAL DAILY
COMMUNITY
End: 2020-10-22

## 2020-10-22 RX ORDER — AMLODIPINE BESYLATE 5 MG/1
5 TABLET ORAL EVERY MORNING
COMMUNITY

## 2020-10-22 RX ORDER — ATORVASTATIN CALCIUM 20 MG/1
20 TABLET, FILM COATED ORAL DAILY
COMMUNITY
End: 2020-10-22

## 2020-10-22 RX ORDER — ALUMINA, MAGNESIA, AND SIMETHICONE 2400; 2400; 240 MG/30ML; MG/30ML; MG/30ML
10 SUSPENSION ORAL 4 TIMES DAILY PRN
COMMUNITY
End: 2021-09-15

## 2020-10-22 RX ORDER — LABETALOL HYDROCHLORIDE 5 MG/ML
10 INJECTION, SOLUTION INTRAVENOUS EVERY 4 HOURS PRN
Status: DISCONTINUED | OUTPATIENT
Start: 2020-10-22 | End: 2020-10-26 | Stop reason: HOSPADM

## 2020-10-22 RX ORDER — ENALAPRIL MALEATE 10 MG/1
30 TABLET ORAL
Status: DISCONTINUED | OUTPATIENT
Start: 2020-10-23 | End: 2020-10-26 | Stop reason: HOSPADM

## 2020-10-22 RX ORDER — ENALAPRILAT 1.25 MG/ML
1.25 INJECTION INTRAVENOUS EVERY 6 HOURS PRN
Status: DISCONTINUED | OUTPATIENT
Start: 2020-10-22 | End: 2020-10-22

## 2020-10-22 RX ORDER — AZITHROMYCIN 500 MG/1
500 INJECTION, POWDER, LYOPHILIZED, FOR SOLUTION INTRAVENOUS ONCE
Status: COMPLETED | OUTPATIENT
Start: 2020-10-22 | End: 2020-10-22

## 2020-10-22 RX ADMIN — AZITHROMYCIN MONOHYDRATE 500 MG: 500 INJECTION, POWDER, LYOPHILIZED, FOR SOLUTION INTRAVENOUS at 20:50

## 2020-10-22 RX ADMIN — CEFTRIAXONE SODIUM 2 G: 2 INJECTION, POWDER, FOR SOLUTION INTRAMUSCULAR; INTRAVENOUS at 20:50

## 2020-10-22 RX ADMIN — ASPIRIN 81 MG: 81 TABLET, COATED ORAL at 21:07

## 2020-10-22 SDOH — HEALTH STABILITY: MENTAL HEALTH: HOW OFTEN DO YOU HAVE A DRINK CONTAINING ALCOHOL?: NEVER

## 2020-10-22 ASSESSMENT — ENCOUNTER SYMPTOMS
CHILLS: 0
SHORTNESS OF BREATH: 1
TINGLING: 0
DIZZINESS: 0
VOMITING: 0
HEMOPTYSIS: 0
WHEEZING: 0
EYE PAIN: 0
WEAKNESS: 0
DEPRESSION: 0
NERVOUS/ANXIOUS: 0
HEADACHES: 0
WEIGHT LOSS: 0
BLURRED VISION: 0
SORE THROAT: 0
SPUTUM PRODUCTION: 0
DIARRHEA: 0
MYALGIAS: 0
PALPITATIONS: 0
POLYDIPSIA: 0
ABDOMINAL PAIN: 0
BACK PAIN: 0
COUGH: 0
BLOOD IN STOOL: 0
FEVER: 0
NAUSEA: 0
BRUISES/BLEEDS EASILY: 0

## 2020-10-22 ASSESSMENT — COGNITIVE AND FUNCTIONAL STATUS - GENERAL
SUGGESTED CMS G CODE MODIFIER DAILY ACTIVITY: CH
SUGGESTED CMS G CODE MODIFIER MOBILITY: CH
DAILY ACTIVITIY SCORE: 24
MOBILITY SCORE: 24

## 2020-10-22 ASSESSMENT — LIFESTYLE VARIABLES
AVERAGE NUMBER OF DAYS PER WEEK YOU HAVE A DRINK CONTAINING ALCOHOL: 0
DO YOU DRINK ALCOHOL: NO
CONSUMPTION TOTAL: NEGATIVE
HOW MANY TIMES IN THE PAST YEAR HAVE YOU HAD 5 OR MORE DRINKS IN A DAY: 0
EVER HAD A DRINK FIRST THING IN THE MORNING TO STEADY YOUR NERVES TO GET RID OF A HANGOVER: NO
DOES PATIENT WANT TO STOP DRINKING: NO
ON A TYPICAL DAY WHEN YOU DRINK ALCOHOL HOW MANY DRINKS DO YOU HAVE: 0
EVER FELT BAD OR GUILTY ABOUT YOUR DRINKING: NO
HAVE PEOPLE ANNOYED YOU BY CRITICIZING YOUR DRINKING: NO
TOTAL SCORE: 0
ALCOHOL_USE: NO
HAVE YOU EVER FELT YOU SHOULD CUT DOWN ON YOUR DRINKING: NO
TOTAL SCORE: 0
TOTAL SCORE: 0

## 2020-10-22 ASSESSMENT — FIBROSIS 4 INDEX
FIB4 SCORE: 1.6
FIB4 SCORE: 1.44

## 2020-10-22 ASSESSMENT — PATIENT HEALTH QUESTIONNAIRE - PHQ9
1. LITTLE INTEREST OR PLEASURE IN DOING THINGS: NOT AT ALL
2. FEELING DOWN, DEPRESSED, IRRITABLE, OR HOPELESS: NOT AT ALL
SUM OF ALL RESPONSES TO PHQ9 QUESTIONS 1 AND 2: 0

## 2020-10-23 LAB
ANION GAP SERPL CALC-SCNC: 10 MMOL/L (ref 7–16)
BASOPHILS # BLD AUTO: 0.7 % (ref 0–1.8)
BASOPHILS # BLD: 0.07 K/UL (ref 0–0.12)
BUN SERPL-MCNC: 15 MG/DL (ref 8–22)
CALCIUM SERPL-MCNC: 8.2 MG/DL (ref 8.5–10.5)
CHLORIDE SERPL-SCNC: 105 MMOL/L (ref 96–112)
CO2 SERPL-SCNC: 22 MMOL/L (ref 20–33)
COVID ORDER STATUS COVID19: NORMAL
CREAT SERPL-MCNC: 1.15 MG/DL (ref 0.5–1.4)
CRP SERPL HS-MCNC: 21.59 MG/DL (ref 0–0.75)
EOSINOPHIL # BLD AUTO: 0.07 K/UL (ref 0–0.51)
EOSINOPHIL NFR BLD: 0.7 % (ref 0–6.9)
ERYTHROCYTE [DISTWIDTH] IN BLOOD BY AUTOMATED COUNT: 43.2 FL (ref 35.9–50)
GLUCOSE SERPL-MCNC: 109 MG/DL (ref 65–99)
HCT VFR BLD AUTO: 38.4 % (ref 42–52)
HGB BLD-MCNC: 13.2 G/DL (ref 14–18)
IMM GRANULOCYTES # BLD AUTO: 0.03 K/UL (ref 0–0.11)
IMM GRANULOCYTES NFR BLD AUTO: 0.3 % (ref 0–0.9)
LYMPHOCYTES # BLD AUTO: 0.74 K/UL (ref 1–4.8)
LYMPHOCYTES NFR BLD: 7.2 % (ref 22–41)
MCH RBC QN AUTO: 28.8 PG (ref 27–33)
MCHC RBC AUTO-ENTMCNC: 34.4 G/DL (ref 33.7–35.3)
MCV RBC AUTO: 83.7 FL (ref 81.4–97.8)
MONOCYTES # BLD AUTO: 0.54 K/UL (ref 0–0.85)
MONOCYTES NFR BLD AUTO: 5.3 % (ref 0–13.4)
NEUTROPHILS # BLD AUTO: 8.81 K/UL (ref 1.82–7.42)
NEUTROPHILS NFR BLD: 85.8 % (ref 44–72)
NRBC # BLD AUTO: 0 K/UL
NRBC BLD-RTO: 0 /100 WBC
PLATELET # BLD AUTO: 151 K/UL (ref 164–446)
PMV BLD AUTO: 12.3 FL (ref 9–12.9)
POTASSIUM SERPL-SCNC: 4 MMOL/L (ref 3.6–5.5)
PROCALCITONIN SERPL-MCNC: 2.36 NG/ML
RBC # BLD AUTO: 4.59 M/UL (ref 4.7–6.1)
SODIUM SERPL-SCNC: 137 MMOL/L (ref 135–145)
WBC # BLD AUTO: 10.3 K/UL (ref 4.8–10.8)

## 2020-10-23 PROCEDURE — 99233 SBSQ HOSP IP/OBS HIGH 50: CPT | Performed by: HOSPITALIST

## 2020-10-23 PROCEDURE — 700102 HCHG RX REV CODE 250 W/ 637 OVERRIDE(OP): Performed by: STUDENT IN AN ORGANIZED HEALTH CARE EDUCATION/TRAINING PROGRAM

## 2020-10-23 PROCEDURE — 84145 PROCALCITONIN (PCT): CPT

## 2020-10-23 PROCEDURE — 85025 COMPLETE CBC W/AUTO DIFF WBC: CPT

## 2020-10-23 PROCEDURE — 700105 HCHG RX REV CODE 258: Performed by: STUDENT IN AN ORGANIZED HEALTH CARE EDUCATION/TRAINING PROGRAM

## 2020-10-23 PROCEDURE — 94640 AIRWAY INHALATION TREATMENT: CPT

## 2020-10-23 PROCEDURE — U0003 INFECTIOUS AGENT DETECTION BY NUCLEIC ACID (DNA OR RNA); SEVERE ACUTE RESPIRATORY SYNDROME CORONAVIRUS 2 (SARS-COV-2) (CORONAVIRUS DISEASE [COVID-19]), AMPLIFIED PROBE TECHNIQUE, MAKING USE OF HIGH THROUGHPUT TECHNOLOGIES AS DESCRIBED BY CMS-2020-01-R: HCPCS

## 2020-10-23 PROCEDURE — 770020 HCHG ROOM/CARE - TELE (206)

## 2020-10-23 PROCEDURE — C9803 HOPD COVID-19 SPEC COLLECT: HCPCS | Performed by: HOSPITALIST

## 2020-10-23 PROCEDURE — 80048 BASIC METABOLIC PNL TOTAL CA: CPT

## 2020-10-23 PROCEDURE — A9270 NON-COVERED ITEM OR SERVICE: HCPCS | Performed by: STUDENT IN AN ORGANIZED HEALTH CARE EDUCATION/TRAINING PROGRAM

## 2020-10-23 PROCEDURE — 36415 COLL VENOUS BLD VENIPUNCTURE: CPT

## 2020-10-23 PROCEDURE — A9270 NON-COVERED ITEM OR SERVICE: HCPCS | Performed by: HOSPITALIST

## 2020-10-23 PROCEDURE — 700111 HCHG RX REV CODE 636 W/ 250 OVERRIDE (IP): Performed by: STUDENT IN AN ORGANIZED HEALTH CARE EDUCATION/TRAINING PROGRAM

## 2020-10-23 PROCEDURE — 700102 HCHG RX REV CODE 250 W/ 637 OVERRIDE(OP): Performed by: HOSPITALIST

## 2020-10-23 RX ORDER — AZITHROMYCIN 250 MG/1
500 TABLET, FILM COATED ORAL DAILY
Status: COMPLETED | OUTPATIENT
Start: 2020-10-23 | End: 2020-10-24

## 2020-10-23 RX ADMIN — DEXAMETHASONE SODIUM PHOSPHATE 6 MG: 4 INJECTION, SOLUTION INTRA-ARTICULAR; INTRALESIONAL; INTRAMUSCULAR; INTRAVENOUS; SOFT TISSUE at 04:13

## 2020-10-23 RX ADMIN — ENALAPRIL MALEATE 30 MG: 10 TABLET ORAL at 04:15

## 2020-10-23 RX ADMIN — ASPIRIN 81 MG: 81 TABLET, COATED ORAL at 18:24

## 2020-10-23 RX ADMIN — ENOXAPARIN SODIUM 40 MG: 40 INJECTION SUBCUTANEOUS at 04:13

## 2020-10-23 RX ADMIN — AZITHROMYCIN MONOHYDRATE 500 MG: 250 TABLET ORAL at 21:07

## 2020-10-23 RX ADMIN — CEFTRIAXONE SODIUM 2 G: 2 INJECTION, POWDER, FOR SOLUTION INTRAMUSCULAR; INTRAVENOUS at 18:26

## 2020-10-23 NOTE — CARE PLAN
Problem: Oxygenation:  Goal: Maintain adequate oxygenation dependent on patient condition  Outcome: NOT MET   Patient receiving 60 LPM, 80% FIo2 via high flow n/c

## 2020-10-23 NOTE — CARE PLAN
Problem: Communication  Goal: The ability to communicate needs accurately and effectively will improve  Note: Language line used to assist with  effective communication.     Problem: Safety  Goal: Will remain free from injury  Note: Pt remains free of falls, educated on call light system to call for assistance.

## 2020-10-23 NOTE — ASSESSMENT & PLAN NOTE
Repeat covid test neg, continues remdesivir, s/p 1 unit convalescent plasma  CTA ordered  Flu swab pending  D-Dimer 2.23. prophylactic lovenox ordered  Decadron ordered due to patient needing high flow.

## 2020-10-23 NOTE — ED NOTES
Patient to S148/00 with RN on cardiac monitor and oxygen (nonrebreather) high flow taken up with them  All personal belongings sent with patient as well  Lab called regarding recollect of BNP on patient, message relayed to RN at bedside

## 2020-10-23 NOTE — PROGRESS NOTES
2 RN Skin Check    2 RN skin check complete.   Devices in place: IV, oxygen tubing- heated high flow, pillows  Skin assessed under devices: Yes.  The following interventions in place .  Hourly checks, frequent turns, silicone pads under oxygen tubing.

## 2020-10-23 NOTE — PROGRESS NOTES
[facility] TRIAGE OFFICER ER REPORT    Consult/Admission requested by: Dr Downs    Chief complaint: Shortness of breath, cough, hypoxia  Pertinent history/ER Course: 50-year-old male with hypertension, history of MI, who had COVID-19 2 months ago, who presented to the ED today with 2 days of cough, fever, and shortness of breath.  He was noted to be hypoxic at 40% on room air by the EMS and was placed on 15 L of oxygen mask which improved his saturation to 90 to 92%.  Chest x-ray showed moderate bilateral pulmonary airspace process.  COVID-19 test was sent and is pending.  His blood pressure is okay, but he is tachycardic.  Hospitalist service admission is now requested for further management.    Patient meets admission criterion: Yes..  Recommendations given or work up & consultations requested per triage officer: None  Consultants involved and pertinent input from consultants: None    Admission status: Inpatient.   Admission order placed: Yes.   Floor requested: Medical/Covid unit  Assigned hospitalist: Dr. Vega

## 2020-10-23 NOTE — PROGRESS NOTES
With patient’s permission (if able), completed daily phone call to designated support person, jacque Story.  Discussed patient condition and plan of care. All questions answered.

## 2020-10-23 NOTE — CARE PLAN
Problem: Respiratory:  Goal: Respiratory status will improve  Outcome: PROGRESSING SLOWER THAN EXPECTED  Note: Patient remains on highflow 50L/80%     Problem: Communication  Goal: The ability to communicate needs accurately and effectively will improve  Outcome: PROGRESSING AS EXPECTED  Note: iPad  utilized, questions and concerns were addressed, no other questions at the moment

## 2020-10-23 NOTE — ASSESSMENT & PLAN NOTE
This is Severe Sepsis Present on admission  SIRS criteria identified on my evaluation include: Tachycardia, with heart rate greater than 90 BPM, Tachypnea, with respirations greater than 20 per minute and Leukocytosis, with WBC greater than 12,000  Source of infection is pneumonia, suspect COVID  Clinical indicators of end organ dysfunction include Lactate >2 mmol/L (18.0 mg/dL)  Sepsis protocol initiated  Fluid resuscitation ordered per protocol  IV antibiotics as appropriate for source of sepsis  Reassessment: I have reassessed the patient's hemodynamic status  End organ dysfunction include(s):  Acute respiratory failure   azithro and ceftriaxone

## 2020-10-23 NOTE — ED NOTES
Patient using urinal at bedside. Repeat lactate and urine sent to lab. Patient on 15L oxymask. Patient Greenlandic speaking only.  Updated patient's daughter via telephone.

## 2020-10-23 NOTE — H&P
Hospital Medicine History & Physical Note    Date of Service  10/22/2020    Primary Care Physician  Gretchen Callejas P.A.-C.    Consultants  None    Code Status  Full Code    Chief Complaint  Chief Complaint   Patient presents with   • Shortness of Breath       History of Presenting Illness  50 y.o. male who presented 10/22/2020 with a history of CVA with left upper extremity weakness, hypertension who was admitted for shortness of breath.  Patient reports that his symptoms started less than a day ago.  He reports sudden onset and denies fevers, chills, nausea, vomiting.  He has never had any symptoms like this in the past.  He does report at baseline he does become short of breath but does not use oxygen at home.  He denies cough, sputum production, chest pain.  He denies any sick contacts.  He reports that he has been quarantining at home.  He does drive his wife to work and she works at an [employer].  He reports that she has had no sick contacts nor sick coworkers.  In the ED initial Covid test was negative however patient has a an elevated D-dimer, chest x-ray finding showing diffuse bilateral opacities consistent with Covid.    Review of Systems  Review of Systems   Constitutional: Negative for chills, fever and weight loss.   HENT: Negative for hearing loss, sore throat and tinnitus.    Eyes: Negative for blurred vision and pain.   Respiratory: Positive for shortness of breath. Negative for cough, hemoptysis, sputum production and wheezing.    Cardiovascular: Negative for chest pain, palpitations and leg swelling.   Gastrointestinal: Negative for abdominal pain, blood in stool, diarrhea, nausea and vomiting.   Genitourinary: Negative for dysuria, frequency and hematuria.   Musculoskeletal: Negative for back pain, joint pain and myalgias.   Skin: Negative for itching and rash.   Neurological: Negative for dizziness, tingling, weakness and headaches.   Endo/Heme/Allergies: Negative for polydipsia. Does not  bruise/bleed easily.   Psychiatric/Behavioral: Negative for depression. The patient is not nervous/anxious.    All other systems reviewed and are negative.      Past Medical History  Hypertension and MI (myocardial infarction) (HCC).  CVA with left arm weakness    Surgical History  appendectomy.     Family History  Mother with hypertension    Social History  Patient reports he is a former smoker quit 6 months ago.  Denies alcohol or recreational drug use.    Allergies  No Known Allergies    Medications  Prior to Admission Medications   Prescriptions Last Dose Informant Patient Reported? Taking?   Niacin (VITAMIN B-3 PO) 10/22/2020 at AM Patient Yes Yes   Sig: Take 8,000 Int'l Units by mouth every day.   amLODIPine (NORVASC) 5 MG Tab unknown at unknown Patient Yes No   Sig: Take 5 mg by mouth every day.   aspirin EC (ECOTRIN) 81 MG Tablet Delayed Response 10/21/2020 at PM Patient Yes No   Sig: Take 81 mg by mouth every day.   enalapril (VASOTEC) 20 MG tablet 10/22/2020 at AM Patient Yes No   Sig: Take 30 mg by mouth.   mag hydrox-al hydrox-simeth (MAALOX ADVANCED MAX ST) 400-400-40 MG/5ML Suspension 10/21/2020 at PM Patient Yes Yes   Sig: Take 10 mL by mouth 4 times a day as needed.      Facility-Administered Medications: None       Physical Exam  Temp:  [37.1 °C (98.8 °F)] 37.1 °C (98.8 °F)  Pulse:  [121-134] 125  Resp:  [22-32] 22  BP: (139-157)/(80-88) 143/87  SpO2:  [40 %-95 %] 93 %    Physical Exam  Vitals signs and nursing note reviewed.   Constitutional:       General: He is not in acute distress.     Appearance: Normal appearance.   HENT:      Head: Normocephalic and atraumatic.      Nose: Nose normal.      Mouth/Throat:      Mouth: Mucous membranes are moist.      Pharynx: Oropharynx is clear.   Eyes:      Extraocular Movements: Extraocular movements intact.      Pupils: Pupils are equal, round, and reactive to light.   Neck:      Musculoskeletal: Normal range of motion and neck supple. No muscular  tenderness.   Cardiovascular:      Rate and Rhythm: Normal rate and regular rhythm.      Pulses: Normal pulses.      Heart sounds: Normal heart sounds. No murmur.   Pulmonary:      Effort: Tachypnea, accessory muscle usage and respiratory distress present.      Breath sounds: Rales present. No wheezing.   Abdominal:      General: Abdomen is flat. Bowel sounds are normal. There is no distension.      Palpations: Abdomen is soft.      Tenderness: There is no abdominal tenderness.   Musculoskeletal: Normal range of motion.         General: No swelling or tenderness.   Skin:     Coloration: Skin is not jaundiced.      Findings: No bruising.   Neurological:      General: No focal deficit present.      Mental Status: He is alert. Mental status is at baseline.      Cranial Nerves: No cranial nerve deficit.      Comments: Left arm weakness   Psychiatric:         Mood and Affect: Mood normal.         Thought Content: Thought content normal.         Judgment: Judgment normal.         Laboratory:  Recent Labs     10/22/20  1728   WBC 13.0*   RBC 4.86   HEMOGLOBIN 13.8*   HEMATOCRIT 39.7*   MCV 81.7   MCH 28.4   MCHC 34.8   RDW 41.5   PLATELETCT 146*   MPV 12.2     Recent Labs     10/22/20  1728   SODIUM 135   POTASSIUM 3.7   CHLORIDE 99   CO2 16*   GLUCOSE 104*   BUN 17   CREATININE 1.44*   CALCIUM 8.9     Recent Labs     10/22/20  1728   ALTSGPT 14   ASTSGOT 16   ALKPHOSPHAT 103*   TBILIRUBIN 0.7   GLUCOSE 104*         No results for input(s): NTPROBNP in the last 72 hours.      No results for input(s): TROPONINT in the last 72 hours.    Imaging:  DX-CHEST-PORTABLE (1 VIEW)   Final Result      Moderate bilateral pulmonary airspace process which could be infection or edema and Covid pneumonia should be considered in the differential diagnosis            Assessment/Plan:  I anticipate this patient will require at least two midnights for appropriate medical management, necessitating inpatient admission.    * Sepsis  (HCC)  Assessment & Plan  This is Severe Sepsis Present on admission  SIRS criteria identified on my evaluation include: Tachycardia, with heart rate greater than 90 BPM, Tachypnea, with respirations greater than 20 per minute and Leukocytosis, with WBC greater than 12,000  Source of infection is pneumonia, suspect COVID  Clinical indicators of end organ dysfunction include Lactate >2 mmol/L (18.0 mg/dL)  Sepsis protocol initiated  Fluid resuscitation ordered per protocol  IV antibiotics as appropriate for source of sepsis  Reassessment: I have reassessed the patient's hemodynamic status  End organ dysfunction include(s):  Acute respiratory failure  Started on azithro and ceftriaxone  Suspect covid    Suspected COVID-19 virus infection  Assessment & Plan  COVID suspected. Initial test negative. Will reorder test. Cont COVID precautions  D-Dimer 2.23. prophylactic lovenox ordered  Decadron ordered due to patient needing high flow.    Bilateral pneumonia  Assessment & Plan  CXR show bilateral pneumonia.  Start Azithromycin and Rocephin  Suspect COVID    Acute respiratory failure with hypoxia (HCC)  Assessment & Plan  Patient transition to high flow.  Patient has pneumonia likely secondary to Covid. Patient may also have underlying shortness of breath due to COPD.  He does not use oxygen at home.  Continue antibiotics azithromycin and Rocephin  Start Decadron    HTN (hypertension)  Assessment & Plan  Cont home med enalapril. PRN labetalol    H/O: CVA (cerebrovascular accident)  Assessment & Plan  H/o CVA with left sided upper extremity weakness. stable    DVT prophylaxis: Lovenox

## 2020-10-23 NOTE — ED NOTES
Med rec partial per interview with pt at bedside with use of ipad  (Isidra [id number]). Pt states that he takes a 5 mg medication qday but does not know the name or purpose of the med. Pt home pharmacy is closed. Per historical encounter pt was on Amlodipine 5 mg qday (left on med rec) but pt could not verify if this was the medication he was taking. Allergies reviewed, pt denies antibiotic use in the past 14 days.

## 2020-10-23 NOTE — ED NOTES
.  Chief Complaint   Patient presents with   • Shortness of Breath     Pt arrived to [facility] from [facility]. Pt had low sats placed on 6l nc remains sats in the 70-low 80's. Pt placed on oxy mask sats 94% 15l. Pt reports onset [id number]. Fevers x 2 days last took tylenol at 1400. Pt denies chest pain, has fatigue and pressure when ambulating. Slovak interpretor [id number] assisted with translating.   Pt tested + for covid on July 26 while in [place]. Was tested yesterday at pcp office no results yet.  Mask applied to patient prior to triage. This RN in ppe prior to encounter.

## 2020-10-23 NOTE — ASSESSMENT & PLAN NOTE
Patient transition to high flow.  Persistent hypoxic  Patient has pneumonia likely secondary to Covid. Patient may also have underlying shortness of breath due to COPD.  He does not use oxygen at home.  Continue antibiotics azithromycin and Rocephin  Continue Decadron  ID consulted- started on remdesivir and s/p 1 unit convalescent plasma  Repeat covid test -, will obtain CTA and Flu swab  Ildefonso adding lasix 40mg daily

## 2020-10-23 NOTE — ED PROVIDER NOTES
"ED Provider Note    CHIEF COMPLAINT  Chief Complaint   Patient presents with   • Shortness of Breath       HPI  Lamont Roach is a 50 y.o. male who presents for evaluation of shortness of breath and fever that began last night, the patient has a history of COVID-19 couple months ago.  He states his current symptoms are similar to then.  Got tested yesterday for COVID-19 again but his results were not yet ready.  The patient denies any pain in the chest, he has no abdominal pain or vomiting or diarrhea or rash.  History significant for hypertension and coronary disease with MI.  The patient is in a degree of distress and even with the  history is very difficult to obtain    REVIEW OF SYSTEMS  Negative for rash, chest pain, abdominal pain, vomiting, diarrhea, focal weakness, focal numbness, focal tingling All other systems are negative.     PAST MEDICAL HISTORY  Past Medical History:   Diagnosis Date   • Hypertension    • MI (myocardial infarction) (HCC)        FAMILY HISTORY  History reviewed. No pertinent family history.    SOCIAL HISTORY  Social History     Tobacco Use   • Smoking status: Former Smoker   • Smokeless tobacco: Never Used   Substance Use Topics   • Alcohol use: Never     Frequency: Never   • Drug use: Never       SURGICAL HISTORY  Past Surgical History:   Procedure Laterality Date   • APPENDECTOMY         CURRENT MEDICATIONS  I personally reviewed the medication list in the charting documentation.     ALLERGIES  No Known Allergies    MEDICAL RECORD  I have reviewed patient's medical record and pertinent results are listed above.      PHYSICAL EXAM  VITAL SIGNS: /82   Pulse (!) 124   Temp 37.1 °C (98.8 °F) (Temporal)   Resp (!) 65   Ht 1.753 m (5' 9\")   Wt 92.5 kg (204 lb)   SpO2 40%   BMI 30.13 kg/m²    Constitutional: Acutely ill-appearing, acute respiratory distress  HENT: Normocephalic, no evidence of acute trauma.  Eyes: No scleral icterus. Normal conjunctiva   Neck: " Supple, comfortable, nonpainful range of motion.   Cardiovascular: Tachycardic, regular.   Thorax & Lungs: Increased work of breathing and increased respiratory rate, good air movement, no wheezing, bibasilar rales  Abdomen: Soft, with no tenderness, rebound nor guarding.  No mass or pulsatile mass appreciated.  Skin: Warm, dry. No rash appreciated  Extremities/Musculoskeletal: No sign of trauma. No asymmetric calf tenderness, erythema or edema. Normal range of motion   Neurologic: Alert & oriented. No focal deficits observed.   Psychiatric: Normal affect appropriate for the clinical situation.    DIAGNOSTIC STUDIES / PROCEDURES    LABS/EKGs  Results for orders placed or performed during the hospital encounter of 10/22/20   Lactic acid (lactate)   Result Value Ref Range    Lactic Acid 3.7 (H) 0.5 - 2.0 mmol/L   Lactic acid (lactate): Repeat if initial lactic acid result is greater than 2   Result Value Ref Range    Lactic Acid 1.8 0.5 - 2.0 mmol/L   CBC WITH DIFFERENTIAL   Result Value Ref Range    WBC 13.0 (H) 4.8 - 10.8 K/uL    RBC 4.86 4.70 - 6.10 M/uL    Hemoglobin 13.8 (L) 14.0 - 18.0 g/dL    Hematocrit 39.7 (L) 42.0 - 52.0 %    MCV 81.7 81.4 - 97.8 fL    MCH 28.4 27.0 - 33.0 pg    MCHC 34.8 33.7 - 35.3 g/dL    RDW 41.5 35.9 - 50.0 fL    Platelet Count 146 (L) 164 - 446 K/uL    MPV 12.2 9.0 - 12.9 fL    Neutrophils-Polys 84.20 (H) 44.00 - 72.00 %    Lymphocytes 8.60 (L) 22.00 - 41.00 %    Monocytes 6.20 0.00 - 13.40 %    Eosinophils 0.30 0.00 - 6.90 %    Basophils 0.30 0.00 - 1.80 %    Immature Granulocytes 0.40 0.00 - 0.90 %    Nucleated RBC 0.00 /100 WBC    Neutrophils (Absolute) 10.92 (H) 1.82 - 7.42 K/uL    Lymphs (Absolute) 1.11 1.00 - 4.80 K/uL    Monos (Absolute) 0.81 0.00 - 0.85 K/uL    Eos (Absolute) 0.04 0.00 - 0.51 K/uL    Baso (Absolute) 0.04 0.00 - 0.12 K/uL    Immature Granulocytes (abs) 0.05 0.00 - 0.11 K/uL    NRBC (Absolute) 0.00 K/uL   COMP METABOLIC PANEL   Result Value Ref Range    Sodium  135 135 - 145 mmol/L    Potassium 3.7 3.6 - 5.5 mmol/L    Chloride 99 96 - 112 mmol/L    Co2 16 (L) 20 - 33 mmol/L    Anion Gap 20.0 (H) 7.0 - 16.0    Glucose 104 (H) 65 - 99 mg/dL    Bun 17 8 - 22 mg/dL    Creatinine 1.44 (H) 0.50 - 1.40 mg/dL    Calcium 8.9 8.5 - 10.5 mg/dL    AST(SGOT) 16 12 - 45 U/L    ALT(SGPT) 14 2 - 50 U/L    Alkaline Phosphatase 103 (H) 30 - 99 U/L    Total Bilirubin 0.7 0.1 - 1.5 mg/dL    Albumin 3.5 3.2 - 4.9 g/dL    Total Protein 7.5 6.0 - 8.2 g/dL    Globulin 4.0 (H) 1.9 - 3.5 g/dL    A-G Ratio 0.9 g/dL   URINALYSIS    Specimen: Urine   Result Value Ref Range    Color Yellow     Character Clear     Specific Gravity 1.015 <1.035    Ph 5.5 5.0 - 8.0    Glucose Negative Negative mg/dL    Ketones Negative Negative mg/dL    Protein 300 (A) Negative mg/dL    Bilirubin Negative Negative    Urobilinogen, Urine 0.2 Negative    Nitrite Negative Negative    Leukocyte Esterase Negative Negative    Occult Blood Small (A) Negative    Micro Urine Req Microscopic    COVID/SARS CoV-2 PCR    Specimen: Nasopharyngeal; Respirate   Result Value Ref Range    COVID Order Status Received    SARS-CoV-2, PCR (In-House)   Result Value Ref Range    SARS-CoV-2 Source NP Swab     SARS-CoV-2 by PCR NotDetected    ESTIMATED GFR   Result Value Ref Range    GFR If African American >60 >60 mL/min/1.73 m 2    GFR If Non African American 52 (A) >60 mL/min/1.73 m 2   URINE MICROSCOPIC (W/UA)   Result Value Ref Range    WBC 2-5 (A) /hpf    RBC 5-10 (A) /hpf    Bacteria Negative None /hpf    Epithelial Cells Negative /hpf    Hyaline Cast 0-2 /lpf   EKG   Result Value Ref Range    Report       [facility] Emergency Dept.    Test Date:  2020-10-22  Pt Name:    MADDI BARRIENTOS           Department: ER  MRN:        [id number]                      Room:       RD 07  Gender:     Male                         Technician: ROSALINE  :        [id number]                   Requested By:ER TRIAGE PROTOCOL  Order #:     [id number]                    Reading MD: JOE JONES MD    Measurements  Intervals                                Axis  Rate:       124                          P:          47  NC:         156                          QRS:        -17  QRSD:       88                           T:          48  QT:         304  QTc:        437    Interpretive Statements  12 Lead EKG interpreted by me to show: -- Rate 124 -- Rhythm: Normal sinus  rhythm -- Axis: Normal -- NC and QRS Intervals: Normal -- T waves: No acute  changes -- ST segments: No acute changes -- Ectopy: None. My impression of  this  EKG: Does not indicate acute ischemia at this time.  Electronically Sig isael On 10- 18:05:25 PDT by JOE JONES MD          RADIOLOGY  DX-CHEST-PORTABLE (1 VIEW)   Final Result      Moderate bilateral pulmonary airspace process which could be infection or edema and Covid pneumonia should be considered in the differential diagnosis            COURSE & MEDICAL DECISION MAKING  I have reviewed any medical record information, laboratory studies and radiographic results as noted above.  Differential diagnoses includes: COVID-19, pneumonia, pneumothorax, dehydration, electrolyte abnormalities, sepsis anemia    Encounter Summary: This is a 50 y.o. male with fever that began yesterday associated with shortness of breath, his shortness of breath has progressed, upon arrival here he is in acute respiratory distress with oxygen saturation of 40% on room air, brought directly back to the room and I was called to emergently evaluate him at the bedside.  Difficult obtain history because of his degree of distress.  Lungs are actually quite clear on exam without wheezing, some bibasilar crackles but no other abnormalities appreciated.  Seems most consistent with COVID-19.  Will evaluate him with a regular septic work-up including a chest x-ray, will not initiate antibiotic treatment until the x-ray is performed as it would not be  indicated in the setting of COVID-19.  He is requiring 15 L of supplemental oxygen at this point with his oxygen saturations now in the low 90s.------- x-ray is consistent with COVID-19.  Covid swab however has come back negative which I have a high suspicion for a false negative although at this point the treatment will be expanded to include broad-spectrum antibiotics and the patient will be admitted to the hospital in guarded condition.    CRITICAL CARE  The very real possibilty of a deterioration of this patient's condition required the highest level of my preparedness for sudden, emergent intervention.  I provided critical care services, which included medication orders, frequent reevaluations of the patient's condition and response to treatment, ordering and reviewing test results, and discussing the case with various consultants.  The critical care time associated with the care of the patient was 39 minutes. Review chart for interventions. This time is exclusive of any other billable procedures.          DISPOSITION: Admit in guarded condition      FINAL IMPRESSION  1. Acute respiratory failure with hypoxia (HCC)    2. Sepsis, due to unspecified organism, unspecified whether acute organ dysfunction present (HCC)    3. Pneumonia of both lungs due to infectious organism, unspecified part of lung           This dictation was created using voice recognition software. The accuracy of the dictation is limited to the abilities of the software. I expect there may be some errors of grammar and possibly content. The nursing notes were reviewed and certain aspects of this information were incorporated into this note.    Electronically signed by: Chucky Stark M.D., 10/22/2020 6:05 PM

## 2020-10-24 ENCOUNTER — APPOINTMENT (OUTPATIENT)
Dept: RADIOLOGY | Facility: MEDICAL CENTER | Age: 50
DRG: 871 | End: 2020-10-24
Attending: HOSPITALIST
Payer: MEDICARE

## 2020-10-24 LAB
ABO GROUP BLD: NORMAL
ALBUMIN SERPL BCP-MCNC: 3.1 G/DL (ref 3.2–4.9)
ALBUMIN/GLOB SERPL: 0.8 G/DL
ALP SERPL-CCNC: 98 U/L (ref 30–99)
ALT SERPL-CCNC: 12 U/L (ref 2–50)
ANION GAP SERPL CALC-SCNC: 13 MMOL/L (ref 7–16)
AST SERPL-CCNC: 10 U/L (ref 12–45)
BACTERIA UR CULT: NORMAL
BARCODED ABORH UBTYP: 6200
BARCODED PRD CODE UBPRD: NORMAL
BARCODED UNIT NUM UBUNT: NORMAL
BILIRUB SERPL-MCNC: 0.4 MG/DL (ref 0.1–1.5)
BUN SERPL-MCNC: 30 MG/DL (ref 8–22)
CALCIUM SERPL-MCNC: 9.1 MG/DL (ref 8.5–10.5)
CHLORIDE SERPL-SCNC: 98 MMOL/L (ref 96–112)
CO2 SERPL-SCNC: 21 MMOL/L (ref 20–33)
COMPONENT F 8504F: NORMAL
CREAT SERPL-MCNC: 1.36 MG/DL (ref 0.5–1.4)
GLOBULIN SER CALC-MCNC: 4.1 G/DL (ref 1.9–3.5)
GLUCOSE SERPL-MCNC: 161 MG/DL (ref 65–99)
POTASSIUM SERPL-SCNC: 3.9 MMOL/L (ref 3.6–5.5)
PRODUCT TYPE UPROD: NORMAL
PROT SERPL-MCNC: 7.2 G/DL (ref 6–8.2)
RH BLD: NORMAL
SARS-COV-2 RNA RESP QL NAA+PROBE: NOTDETECTED
SIGNIFICANT IND 70042: NORMAL
SITE SITE: NORMAL
SODIUM SERPL-SCNC: 132 MMOL/L (ref 135–145)
SOURCE SOURCE: NORMAL
SPECIMEN SOURCE: NORMAL
UNIT STATUS USTAT: NORMAL

## 2020-10-24 PROCEDURE — 99223 1ST HOSP IP/OBS HIGH 75: CPT | Performed by: INTERNAL MEDICINE

## 2020-10-24 PROCEDURE — 86900 BLOOD TYPING SEROLOGIC ABO: CPT

## 2020-10-24 PROCEDURE — 700102 HCHG RX REV CODE 250 W/ 637 OVERRIDE(OP): Performed by: STUDENT IN AN ORGANIZED HEALTH CARE EDUCATION/TRAINING PROGRAM

## 2020-10-24 PROCEDURE — 71250 CT THORAX DX C-: CPT

## 2020-10-24 PROCEDURE — 86901 BLOOD TYPING SEROLOGIC RH(D): CPT

## 2020-10-24 PROCEDURE — 700101 HCHG RX REV CODE 250: Performed by: INTERNAL MEDICINE

## 2020-10-24 PROCEDURE — 770020 HCHG ROOM/CARE - TELE (206)

## 2020-10-24 PROCEDURE — 94640 AIRWAY INHALATION TREATMENT: CPT

## 2020-10-24 PROCEDURE — 36415 COLL VENOUS BLD VENIPUNCTURE: CPT

## 2020-10-24 PROCEDURE — 700111 HCHG RX REV CODE 636 W/ 250 OVERRIDE (IP): Performed by: STUDENT IN AN ORGANIZED HEALTH CARE EDUCATION/TRAINING PROGRAM

## 2020-10-24 PROCEDURE — 99233 SBSQ HOSP IP/OBS HIGH 50: CPT | Performed by: HOSPITALIST

## 2020-10-24 PROCEDURE — 94760 N-INVAS EAR/PLS OXIMETRY 1: CPT

## 2020-10-24 PROCEDURE — 36430 TRANSFUSION BLD/BLD COMPNT: CPT

## 2020-10-24 PROCEDURE — 80053 COMPREHEN METABOLIC PANEL: CPT

## 2020-10-24 PROCEDURE — XW13325 TRANSFUSION OF CONVALESCENT PLASMA (NONAUTOLOGOUS) INTO PERIPHERAL VEIN, PERCUTANEOUS APPROACH, NEW TECHNOLOGY GROUP 5: ICD-10-PCS | Performed by: INTERNAL MEDICINE

## 2020-10-24 PROCEDURE — XW033E5 INTRODUCTION OF REMDESIVIR ANTI-INFECTIVE INTO PERIPHERAL VEIN, PERCUTANEOUS APPROACH, NEW TECHNOLOGY GROUP 5: ICD-10-PCS | Performed by: INTERNAL MEDICINE

## 2020-10-24 PROCEDURE — A9270 NON-COVERED ITEM OR SERVICE: HCPCS | Performed by: HOSPITALIST

## 2020-10-24 PROCEDURE — 700102 HCHG RX REV CODE 250 W/ 637 OVERRIDE(OP): Performed by: HOSPITALIST

## 2020-10-24 PROCEDURE — P9017 PLASMA 1 DONOR FRZ W/IN 8 HR: HCPCS

## 2020-10-24 PROCEDURE — A9270 NON-COVERED ITEM OR SERVICE: HCPCS | Performed by: STUDENT IN AN ORGANIZED HEALTH CARE EDUCATION/TRAINING PROGRAM

## 2020-10-24 PROCEDURE — 700105 HCHG RX REV CODE 258: Performed by: INTERNAL MEDICINE

## 2020-10-24 RX ORDER — AMOXICILLIN AND CLAVULANATE POTASSIUM 875; 125 MG/1; MG/1
1 TABLET, FILM COATED ORAL EVERY 12 HOURS
Status: DISCONTINUED | OUTPATIENT
Start: 2020-10-24 | End: 2020-10-26 | Stop reason: HOSPADM

## 2020-10-24 RX ADMIN — REMDESIVIR 200 MG: 5 INJECTION INTRAVENOUS at 16:29

## 2020-10-24 RX ADMIN — AMOXICILLIN AND CLAVULANATE POTASSIUM 1 TABLET: 875; 125 TABLET, FILM COATED ORAL at 16:30

## 2020-10-24 RX ADMIN — AZITHROMYCIN MONOHYDRATE 500 MG: 250 TABLET ORAL at 16:30

## 2020-10-24 RX ADMIN — DOCUSATE SODIUM 50 MG AND SENNOSIDES 8.6 MG 2 TABLET: 8.6; 5 TABLET, FILM COATED ORAL at 16:29

## 2020-10-24 RX ADMIN — ENOXAPARIN SODIUM 40 MG: 40 INJECTION SUBCUTANEOUS at 05:41

## 2020-10-24 RX ADMIN — ENALAPRIL MALEATE 30 MG: 10 TABLET ORAL at 05:40

## 2020-10-24 RX ADMIN — DEXAMETHASONE SODIUM PHOSPHATE 6 MG: 4 INJECTION, SOLUTION INTRA-ARTICULAR; INTRALESIONAL; INTRAMUSCULAR; INTRAVENOUS; SOFT TISSUE at 05:40

## 2020-10-24 RX ADMIN — ASPIRIN 81 MG: 81 TABLET, COATED ORAL at 16:29

## 2020-10-24 ASSESSMENT — ENCOUNTER SYMPTOMS
PALPITATIONS: 0
HEADACHES: 0
COUGH: 0
SHORTNESS OF BREATH: 1
SPUTUM PRODUCTION: 0
DIARRHEA: 0
VOMITING: 0
POLYDIPSIA: 0
DIZZINESS: 0
ABDOMINAL PAIN: 0
MYALGIAS: 0
BACK PAIN: 0
EYE PAIN: 0
BLOOD IN STOOL: 0
BLURRED VISION: 0
WHEEZING: 0
NAUSEA: 0
SORE THROAT: 0
DEPRESSION: 0
HEMOPTYSIS: 0
COUGH: 1
CHILLS: 0
WEIGHT LOSS: 0
FEVER: 0
NERVOUS/ANXIOUS: 0
BRUISES/BLEEDS EASILY: 0
TINGLING: 0
WEAKNESS: 0

## 2020-10-24 ASSESSMENT — FIBROSIS 4 INDEX: FIB4 SCORE: 1.44

## 2020-10-24 NOTE — PROGRESS NOTES
[facility] Medicine Daily Progress Note    Date of Service  10/23/20  Chief Complaint  50 y.o. male admitted 10/22/2020 with   Hospital Course    50 y.o. male who presented 10/22/2020 with a history of CVA with left upper extremity weakness, hypertension who was admitted for shortness of breath.  Patient reports that his symptoms started less than a day ago.  He reports sudden onset and denies fevers, chills, nausea, vomiting.  He has never had any symptoms like this in the past.  He does report at baseline he does become short of breath but does not use oxygen at home.  He denies cough, sputum production, chest pain.  He denies any sick contacts.  He reports that he has been quarantining at home.  He does drive his wife to work and she works at an [employer].  He reports that she has had no sick contacts nor sick coworkers.  In the ED initial Covid test was negative however patient has a an elevated D-dimer, chest x-ray finding showing diffuse bilateral opacities consistent with Covid.        Interval Problem Update  Patient is on high flow this morning, continues to have sob and coughing.   Initial covid test was neg, will repeat  CXR findings consistent with covid infection  Monitor closely  Continue abx and decardon  Consider ID consult if covid + and on high flow or worsening  Consultants/Specialty  ID    Code Status  Full Code    Disposition  tbd    Review of Systems  Review of Systems   Constitutional: Negative for chills, fever and weight loss.   HENT: Negative for hearing loss, sore throat and tinnitus.    Eyes: Negative for blurred vision and pain.   Respiratory: Positive for cough and shortness of breath. Negative for hemoptysis, sputum production and wheezing.    Cardiovascular: Negative for chest pain, palpitations and leg swelling.   Gastrointestinal: Negative for abdominal pain, blood in stool, diarrhea, nausea and vomiting.   Genitourinary: Negative for dysuria, frequency and hematuria.    Musculoskeletal: Negative for back pain, joint pain and myalgias.   Skin: Negative for itching and rash.   Neurological: Negative for dizziness, tingling, weakness and headaches.   Endo/Heme/Allergies: Negative for polydipsia. Does not bruise/bleed easily.   Psychiatric/Behavioral: Negative for depression. The patient is not nervous/anxious.    All other systems reviewed and are negative.       Physical Exam  Temp:  [35.8 °C (96.5 °F)-36.5 °C (97.7 °F)] 36.5 °C (97.7 °F)  Pulse:  [] 91  Resp:  [16-24] 16  BP: (122-141)/(76-99) 138/85  SpO2:  [91 %-99 %] 91 %    Physical Exam  Vitals signs and nursing note reviewed.   Constitutional:       General: He is not in acute distress.     Appearance: Normal appearance.   HENT:      Head: Normocephalic and atraumatic.      Nose: Nose normal.      Mouth/Throat:      Mouth: Mucous membranes are moist.      Pharynx: Oropharynx is clear.   Eyes:      Extraocular Movements: Extraocular movements intact.      Pupils: Pupils are equal, round, and reactive to light.   Neck:      Musculoskeletal: Normal range of motion and neck supple. No muscular tenderness.   Cardiovascular:      Rate and Rhythm: Normal rate and regular rhythm.      Pulses: Normal pulses.      Heart sounds: Normal heart sounds. No murmur.   Pulmonary:      Effort: Tachypnea, accessory muscle usage and respiratory distress present.      Breath sounds: Rales present. No wheezing.   Abdominal:      General: Abdomen is flat. Bowel sounds are normal. There is no distension.      Palpations: Abdomen is soft.      Tenderness: There is no abdominal tenderness.   Musculoskeletal: Normal range of motion.         General: No swelling or tenderness.   Skin:     Coloration: Skin is not jaundiced.      Findings: No bruising.   Neurological:      General: No focal deficit present.      Mental Status: He is alert. Mental status is at baseline.      Cranial Nerves: No cranial nerve deficit.      Comments: Left arm weakness    Psychiatric:         Mood and Affect: Mood normal.         Thought Content: Thought content normal.         Judgment: Judgment normal.         Fluids    Intake/Output Summary (Last 24 hours) at 10/24/2020 1050  Last data filed at 10/24/2020 0845  Gross per 24 hour   Intake 600 ml   Output 250 ml   Net 350 ml       Laboratory  Recent Labs     10/22/20  1728 10/23/20  0348   WBC 13.0* 10.3   RBC 4.86 4.59*   HEMOGLOBIN 13.8* 13.2*   HEMATOCRIT 39.7* 38.4*   MCV 81.7 83.7   MCH 28.4 28.8   MCHC 34.8 34.4   RDW 41.5 43.2   PLATELETCT 146* 151*   MPV 12.2 12.3     Recent Labs     10/22/20  1728 10/23/20  0348   SODIUM 135 137   POTASSIUM 3.7 4.0   CHLORIDE 99 105   CO2 16* 22   GLUCOSE 104* 109*   BUN 17 15   CREATININE 1.44* 1.15   CALCIUM 8.9 8.2*                   Imaging  DX-CHEST-PORTABLE (1 VIEW)   Final Result      Moderate bilateral pulmonary airspace process which could be infection or edema and Covid pneumonia should be considered in the differential diagnosis           Assessment/Plan  * Sepsis (HCC)  Assessment & Plan  This is Severe Sepsis Present on admission  SIRS criteria identified on my evaluation include: Tachycardia, with heart rate greater than 90 BPM, Tachypnea, with respirations greater than 20 per minute and Leukocytosis, with WBC greater than 12,000  Source of infection is pneumonia, suspect COVID, order repeat test  Clinical indicators of end organ dysfunction include Lactate >2 mmol/L (18.0 mg/dL)  Sepsis protocol initiated  Fluid resuscitation ordered per protocol  IV antibiotics as appropriate for source of sepsis  Reassessment: I have reassessed the patient's hemodynamic status  End organ dysfunction include(s):  Acute respiratory failure   continue azithro and ceftriaxone- procalc elevated      Suspected COVID-19 virus infection  Assessment & Plan  Repeat covid test pending  D-Dimer 2.23. prophylactic lovenox ordered  Decadron ordered due to patient needing high flow.    Bilateral  pneumonia  Assessment & Plan  CXR show bilateral pneumonia.  Continue abx  Suspect covid PNA, retest ordered    Acute respiratory failure with hypoxia (HCC)  Assessment & Plan  Patient transition to high flow.  Patient has pneumonia likely secondary to Covid. Patient may also have underlying shortness of breath due to COPD.  He does not use oxygen at home.  Continue antibiotics azithromycin and Rocephin  Continue Decadron      HTN (hypertension)  Assessment & Plan  Cont home med enalapril. PRN labetalol    H/O: CVA (cerebrovascular accident)  Assessment & Plan  H/o CVA with left sided upper extremity weakness. stable       VTE prophylaxis: lovenox

## 2020-10-24 NOTE — PROGRESS NOTES
With patient’s permission (if able), completed daily phone call to designated support person, name Teri.  Discussed patient condition and plan of care. All questions answered.

## 2020-10-24 NOTE — CARE PLAN
Problem: Oxygenation:  Goal: Maintain adequate oxygenation dependent on patient condition  Outcome: PROGRESSING SLOWER THAN EXPECTED     Currently of HHF and on oxymask 12 L/M

## 2020-10-24 NOTE — CARE PLAN
Problem: Communication  Goal: The ability to communicate needs accurately and effectively will improve  Outcome: PROGRESSING SLOWER THAN EXPECTED  Note: Pt is strictly [place] speaking and needs an  for communication, has expressed receiving mixed information from staff and is anxious about his stay in the unit. Attempted to clear confusion this morning with . All questions and concerns answered at this time.     Problem: Knowledge Deficit  Goal: Knowledge of disease process/condition, treatment plan, diagnostic tests, and medications will improve  Outcome: PROGRESSING SLOWER THAN EXPECTED  Note: Pt educated on current POC and expected outcomes and goals as he stated to receiving mixed information from staff.. All questions and concerns have been answered at this time with the .  Goal: Knowledge of the prescribed therapeutic regimen will improve  Outcome: PROGRESSING SLOWER THAN EXPECTED     Problem: Respiratory:  Goal: Respiratory status will improve  Outcome: PROGRESSING AS EXPECTED  Note: Pt on 15L oxymask from [facility]. Currently sating in the low 90's, educated and encouraged IS, deep breathing, coughing exercises and mobility in support of respiratory function. Will try to further wean off of supplemental oxygen.         Problem: Psychosocial Needs:  Goal: Level of anxiety will decrease  Outcome: PROGRESSING SLOWER THAN EXPECTED

## 2020-10-24 NOTE — PROGRESS NOTES
Attempted to contact pt spouse Arianne for updates. No answer received; voicemail was unavailable.

## 2020-10-24 NOTE — PROGRESS NOTES
PRIOR TO administering convalescent plasma, I informed the patient or parent/caregiver of the following information:  ·        I provided them the  “Fact Sheet for Patients and Parents/Caregivers”  ·        I informed them of therapeutic alternatives to convalescent plasma and the risks and benefits of those alternatives  ·        I informed them that they have the option to accept or refuse convalescent plasma  ·        I informed them that convalescent plasma is not FDA approved, but that it is authorized for use under emergency by the FDA  ·        I informed them of the known risks and benefits of convalescent plasma and discussed the extent to which such risks and benefits are unknown  All information provided and communicated was consistent with the “Fact Sheet for Patients and Parents/Caregivers”.

## 2020-10-24 NOTE — PROGRESS NOTES
[facility] Medicine Daily Progress Note    Date of Service  10/24/2020    Chief Complaint  50 y.o. male admitted 10/22/2020 with   Hospital Course    50 y.o. male who presented 10/22/2020 with a history of CVA with left upper extremity weakness, hypertension who was admitted for shortness of breath.  Patient reports that his symptoms started less than a day ago.  He reports sudden onset and denies fevers, chills, nausea, vomiting.  He has never had any symptoms like this in the past.  He does report at baseline he does become short of breath but does not use oxygen at home.  He denies cough, sputum production, chest pain.  He denies any sick contacts.  He reports that he has been quarantining at home.  He does drive his wife to work and she works at an [employer] store.  He reports that she has had no sick contacts nor sick coworkers.  In the ED initial Covid test was negative however patient has a an elevated D-dimer, chest x-ray finding showing diffuse bilateral opacities consistent with Covid.        Interval Problem Update  Patient is doing ok this morning, continues to have sob and coughing, continues to be on 15L oxymask  Cbc and bmp pending this morning  Continue decardon  Continue abx  continue  Consultants/Specialty  ID    Code Status  Full Code    Disposition  tbd    Review of Systems  Review of Systems   Constitutional: Negative for chills, fever and weight loss.   HENT: Negative for hearing loss, sore throat and tinnitus.    Eyes: Negative for blurred vision and pain.   Respiratory: Positive for shortness of breath. Negative for cough, hemoptysis, sputum production and wheezing.    Cardiovascular: Negative for chest pain, palpitations and leg swelling.   Gastrointestinal: Negative for abdominal pain, blood in stool, diarrhea, nausea and vomiting.   Genitourinary: Negative for dysuria, frequency and hematuria.   Musculoskeletal: Negative for back pain, joint pain and myalgias.   Skin: Negative for itching and  rash.   Neurological: Negative for dizziness, tingling, weakness and headaches.   Endo/Heme/Allergies: Negative for polydipsia. Does not bruise/bleed easily.   Psychiatric/Behavioral: Negative for depression. The patient is not nervous/anxious.    All other systems reviewed and are negative.       Physical Exam  Temp:  [35.8 °C (96.5 °F)-36.5 °C (97.7 °F)] 36.5 °C (97.7 °F)  Pulse:  [] 90  Resp:  [18-24] 18  BP: (122-141)/(76-99) 138/85  SpO2:  [92 %-99 %] 92 %    Physical Exam  Vitals signs and nursing note reviewed.   Constitutional:       General: He is not in acute distress.     Appearance: Normal appearance.   HENT:      Head: Normocephalic and atraumatic.      Nose: Nose normal.      Mouth/Throat:      Mouth: Mucous membranes are moist.      Pharynx: Oropharynx is clear.   Eyes:      Extraocular Movements: Extraocular movements intact.      Pupils: Pupils are equal, round, and reactive to light.   Neck:      Musculoskeletal: Normal range of motion and neck supple. No muscular tenderness.   Cardiovascular:      Rate and Rhythm: Normal rate and regular rhythm.      Pulses: Normal pulses.      Heart sounds: Normal heart sounds. No murmur.   Pulmonary:      Effort: Tachypnea, accessory muscle usage and respiratory distress present.      Breath sounds: Rales present. No wheezing.   Abdominal:      General: Abdomen is flat. Bowel sounds are normal. There is no distension.      Palpations: Abdomen is soft.      Tenderness: There is no abdominal tenderness.   Musculoskeletal: Normal range of motion.         General: No swelling or tenderness.   Skin:     Coloration: Skin is not jaundiced.      Findings: No bruising.   Neurological:      General: No focal deficit present.      Mental Status: He is alert. Mental status is at baseline.      Cranial Nerves: No cranial nerve deficit.      Comments: Left arm weakness   Psychiatric:         Mood and Affect: Mood normal.         Thought Content: Thought content normal.          Judgment: Judgment normal.         Fluids    Intake/Output Summary (Last 24 hours) at 10/24/2020 0911  Last data filed at 10/23/2020 1945  Gross per 24 hour   Intake 240 ml   Output 250 ml   Net -10 ml       Laboratory  Recent Labs     10/22/20  1728 10/23/20  0348   WBC 13.0* 10.3   RBC 4.86 4.59*   HEMOGLOBIN 13.8* 13.2*   HEMATOCRIT 39.7* 38.4*   MCV 81.7 83.7   MCH 28.4 28.8   MCHC 34.8 34.4   RDW 41.5 43.2   PLATELETCT 146* 151*   MPV 12.2 12.3     Recent Labs     10/22/20  1728 10/23/20  0348   SODIUM 135 137   POTASSIUM 3.7 4.0   CHLORIDE 99 105   CO2 16* 22   GLUCOSE 104* 109*   BUN 17 15   CREATININE 1.44* 1.15   CALCIUM 8.9 8.2*                   Imaging  DX-CHEST-PORTABLE (1 VIEW)   Final Result      Moderate bilateral pulmonary airspace process which could be infection or edema and Covid pneumonia should be considered in the differential diagnosis           Assessment/Plan  * Sepsis (HCC)  Assessment & Plan  This is Severe Sepsis Present on admission  SIRS criteria identified on my evaluation include: Tachycardia, with heart rate greater than 90 BPM, Tachypnea, with respirations greater than 20 per minute and Leukocytosis, with WBC greater than 12,000  Source of infection is pneumonia, suspect COVID  Clinical indicators of end organ dysfunction include Lactate >2 mmol/L (18.0 mg/dL)  Sepsis protocol initiated  Fluid resuscitation ordered per protocol  IV antibiotics as appropriate for source of sepsis  Reassessment: I have reassessed the patient's hemodynamic status  End organ dysfunction include(s):  Acute respiratory failure   azithro and ceftriaxone      Suspected COVID-19 virus infection  Assessment & Plan  Repeat covid test pending  D-Dimer 2.23. prophylactic lovenox ordered  Decadron ordered due to patient needing high flow.    Bilateral pneumonia  Assessment & Plan  CXR show bilateral pneumonia.  Continue abx      Acute respiratory failure with hypoxia (HCC)  Assessment & Plan  Patient  transition to high flow.  Patient has pneumonia likely secondary to Covid. Patient may also have underlying shortness of breath due to COPD.  He does not use oxygen at home.  Continue antibiotics azithromycin and Rocephin  Continue Decadron  ID consulted-     HTN (hypertension)  Assessment & Plan  Cont home med enalapril. PRN labetalol    H/O: CVA (cerebrovascular accident)  Assessment & Plan  H/o CVA with left sided upper extremity weakness. stable       VTE prophylaxis: lovenox

## 2020-10-24 NOTE — CARE PLAN
Problem: Communication  Goal: The ability to communicate needs accurately and effectively will improve  Outcome: PROGRESSING AS EXPECTED  Note:  services are being utilized for patient communication.      Problem: Infection  Goal: Will remain free from infection  Outcome: PROGRESSING AS EXPECTED  Note: Pt is tolerating antibiotic regimen.

## 2020-10-24 NOTE — CONSULTS
[facility] INFECTIOUS DISEASES INPATIENT CONSULT NOTE     Date of Service: 10/24/2020    Consult Requested By: Amanda Hoskins M.D.    Reason for Consultation: Suspected COVID-19    Chief Complaint: Shortness of breath    History of Present Illness:     Lamont Roach is a 50 y.o. male admitted 10/22/2020.  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  Patient with no cough or chest pain.  No known contacts with COVID-19.  Patient primarily stays at home, drives his wife to work at an [employer] store, wife is not sick.  In the ER, patient is afebrile, white count 13,000, required high flow nasal cannula initially on admission, thus he was started on dexamethasone as well as ceftriaxone and azithromycin.  Elevated procalcitonin was noted.  Chest x-ray bilateral diffuse airspace opacities concerning for COVID-19 pneumonia.  SARS-CoV-2 PCR negative in the ER, repeat pending.  Oxygen requirements improved, now down to 15 L oxygen mask but patient only satting in the high 80s with this.  Patient notes feeling okay, some shortness of breath but better.  Patient states that he also had a negative Covid test performed 3 days ago at the [facility].    Review of Systems:  All other systems reviewed and are negative expect as noted in HPI    Past Medical History:   Diagnosis Date   • Hypertension    • MI (myocardial infarction) (HCC)        Past Surgical History:   Procedure Laterality Date   • APPENDECTOMY         History reviewed. No pertinent family history.    Social History     Socioeconomic History   • Marital status:      Spouse name: Not on file   • Number of children: Not on file   • Years of education: Not on file   • Highest education level: Not on file   Occupational History   • Not on file   Social Needs   • Financial resource strain: Not on file   •  Food insecurity     Worry: Not on file     Inability: Not on file   • Transportation needs     Medical: Not on file     Non-medical: Not on file   Tobacco Use   • Smoking status: Former Smoker   • Smokeless tobacco: Never Used   Substance and Sexual Activity   • Alcohol use: Never     Frequency: Never   • Drug use: Never   • Sexual activity: Not on file   Lifestyle   • Physical activity     Days per week: Not on file     Minutes per session: Not on file   • Stress: Not on file   Relationships   • Social connections     Talks on phone: Not on file     Gets together: Not on file     Attends [employer] service: Not on file     Active member of club or organization: Not on file     Attends meetings of clubs or organizations: Not on file     Relationship status: Not on file   • Intimate partner violence     Fear of current or ex partner: Not on file     Emotionally abused: Not on file     Physically abused: Not on file     Forced sexual activity: Not on file   Other Topics Concern   • Not on file   Social History Narrative   • Not on file       No Known Allergies    Medications:    Current Facility-Administered Medications:   •  azithromycin (ZITHROMAX) tablet 500 mg, 500 mg, Oral, DAILY, Amanda Hoskins M.D., 500 mg at 10/23/20 2107  •  lactated ringers infusion (BOLUS): BMI greater than 30, 30 mL/kg (Ideal), Intravenous, Once PRN, Danial Vega D.O.  •  cefTRIAXone (ROCEPHIN) 2 g in  mL IVPB, 2 g, Intravenous, Q24HRS, EVELINA ArnoldOBernie, Stopped at 10/23/20 1856  •  senna-docusate (PERICOLACE or SENOKOT S) 8.6-50 MG per tablet 2 Tab, 2 Tab, Oral, BID **AND** polyethylene glycol/lytes (MIRALAX) PACKET 1 Packet, 1 Packet, Oral, QDAY PRN **AND** magnesium hydroxide (MILK OF MAGNESIA) suspension 30 mL, 30 mL, Oral, QDAY PRN **AND** bisacodyl (DULCOLAX) suppository 10 mg, 10 mg, Rectal, QDAY PRN, Danial Vega D.O.  •  Respiratory Therapy Consult, , Nebulization, Continuous RT, Danial Vega D.O.  •  enoxaparin  "(LOVENOX) inj 40 mg, 40 mg, Subcutaneous, DAILY, Danial Vega D.O., 40 mg at 10/24/20 0541  •  labetalol (NORMODYNE/TRANDATE) injection 10 mg, 10 mg, Intravenous, Q4HRS PRN, Danial Vega D.O.  •  aspirin EC (ECOTRIN) tablet 81 mg, 81 mg, Oral, DAILY, Danial Vega D.O., 81 mg at 10/23/20 1824  •  enalapril (VASOTEC) tablet 30 mg, 30 mg, Oral, Q DAY, Danial Vega D.O., 30 mg at 10/24/20 0540  •  dexamethasone (DECADRON) injection 6 mg, 6 mg, Intravenous, DAILY, Danial Vega D.O., 6 mg at 10/24/20 0540    Physical Exam:   Vital Signs: /85   Pulse 91   Temp 36.5 °C (97.7 °F) (Temporal)   Resp 16   Ht 1.753 m (5' 9\")   Wt 100.6 kg (221 lb 12.5 oz)   SpO2 91%   BMI 32.75 kg/m²   Temp  Av.4 °C (97.6 °F)  Min: 35.8 °C (96.5 °F)  Max: 37.2 °C (98.9 °F)  Vital signs reviewed  Constitutional: Patient is oriented to person, place, and time. Appears ill, no acute distress  Head: Atraumatic, normocephalic  Eyes: Conjunctivae normal, EOM intact.   Mouth/Throat: Lips without lesions, facemask oxygen  Neck: Neck supple. No masses/lymphadenopathy  Cardiovascular: Normal rate, regular rhythm, normal S1S2 and intact distal pulses. No murmur, gallop, or friction rub. No pedal edema.  Pulmonary/Chest: Some accessory muscle use, bilateral rales  Abdominal: Soft, non tender. BS + x 4. No masses or hepatosplenomegaly.   Musculoskeletal: No joint tenderness, swelling, erythema, or restriction of motion noted.  Neurological: Alert and oriented to person, place, and time.  Residual left upper extremity weakness noted  Skin: Skin is warm and dry. No rashes or embolic phenomena noted on exposed skin  Psychiatric: Normal mood and affect. Behavior is normal.     LABS:  Recent Labs     10/22/20  1728 10/23/20  0348   WBC 13.0* 10.3      Recent Labs     10/22/20  1728 10/23/20  0348   HEMOGLOBIN 13.8* 13.2*   HEMATOCRIT 39.7* 38.4*   MCV 81.7 83.7   MCH 28.4 28.8   PLATELETCT 146* 151*       Recent Labs     " 10/22/20  1728 10/23/20  0348   SODIUM 135 137   POTASSIUM 3.7 4.0   CHLORIDE 99 105   CO2 16* 22   CREATININE 1.44* 1.15        Recent Labs     10/22/20  1728   ALBUMIN 3.5        MICRO:  No results found for: BLOODCULTU, BLDCULT, BCHOLD     Latest pertinent labs were reviewed    IMAGING STUDIES:  As above    Hospital Course/Assessment:   Lamont Roach is a 50 y.o. male with a history of  known history of CVA with residual left upper extremity weakness, hypertension, admitted on 10/22 with 1 day of acute onset of shortness of breath without fevers, chills, nausea or vomiting, chest x-ray diffuse bilateral airspace opacities concerning for COVID-19 pneumonia.  Initial test in the ER is negative.  Repeat test is pending.    Pertinent Diagnoses:  Suspected COVID-19 pneumonia  Acute hypoxemic respiratory failure  History of CVA with residual left-sided upper extremity weakness  Hypertension  Superimposed community-acquired bacterial pneumonia  CKD stage II    Plan:   -Initial SARS-CoV-2 PCR is negative, repeat is pending  -Patient started on a 10-day course of dexamethasone  -Patient with some improvement in oxygen requirements, down from-nasal cannula oxygen to now 15 L facemask but satting only in the high 80s with this.  Clinical presentation along with imaging findings are highly suspicious for COVID-19 pneumonia  -Repeat SARS-CoV-2 PCR is pending.  If negative, would obtain CTA chest  -Agree with empiric course for superimposed community-acquired bacterial pneumonia given elevated procalcitonin  -Follow pending blood cultures from 10/22, no growth till date  -We will check CMP, and PT/INR for baseline  -Given high clinical suspicion as above and with high oxygen requirements, will empirically initiate a 5-day course of IV remdesivir  -Convalescent plasma via the FDA UA was discussed with patient and patient wishes to try this.  Will transfuse 1 unit when available  -Patient is on prophylactic  Lovenox    Plan was discussed with the primary team, Dr. Hoskins    ID will follow. Please feel free to call with questions.    Andrew Cruz M.D.    Please note that this dictation was created using voice recognition software. I have worked with technical experts from [facility] to optimize the interface.  I have made every reasonable attempt to correct obvious errors, but there may be errors of grammar and possibly content that I did not discover before finalizing the note.

## 2020-10-25 ENCOUNTER — APPOINTMENT (OUTPATIENT)
Dept: RADIOLOGY | Facility: MEDICAL CENTER | Age: 50
DRG: 871 | End: 2020-10-25
Attending: HOSPITALIST
Payer: MEDICARE

## 2020-10-25 LAB
ALBUMIN SERPL BCP-MCNC: 3.2 G/DL (ref 3.2–4.9)
ALBUMIN/GLOB SERPL: 0.9 G/DL
ALP SERPL-CCNC: 85 U/L (ref 30–99)
ALT SERPL-CCNC: 17 U/L (ref 2–50)
ANION GAP SERPL CALC-SCNC: 11 MMOL/L (ref 7–16)
AST SERPL-CCNC: 9 U/L (ref 12–45)
BILIRUB SERPL-MCNC: 0.3 MG/DL (ref 0.1–1.5)
BUN SERPL-MCNC: 31 MG/DL (ref 8–22)
CALCIUM SERPL-MCNC: 8.7 MG/DL (ref 8.5–10.5)
CHLORIDE SERPL-SCNC: 98 MMOL/L (ref 96–112)
CO2 SERPL-SCNC: 24 MMOL/L (ref 20–33)
CREAT SERPL-MCNC: 1.37 MG/DL (ref 0.5–1.4)
FLUAV RNA SPEC QL NAA+PROBE: NEGATIVE
FLUBV RNA SPEC QL NAA+PROBE: NEGATIVE
GLOBULIN SER CALC-MCNC: 3.6 G/DL (ref 1.9–3.5)
GLUCOSE SERPL-MCNC: 156 MG/DL (ref 65–99)
HIV 1+2 AB+HIV1 P24 AG SERPL QL IA: NORMAL
IL6 SERPL-MCNC: 262.3 PG/ML
INR PPP: 1.13 (ref 0.87–1.13)
POTASSIUM SERPL-SCNC: 3.8 MMOL/L (ref 3.6–5.5)
PROT SERPL-MCNC: 6.8 G/DL (ref 6–8.2)
PROTHROMBIN TIME: 14.8 SEC (ref 12–14.6)
SODIUM SERPL-SCNC: 133 MMOL/L (ref 135–145)

## 2020-10-25 PROCEDURE — 700111 HCHG RX REV CODE 636 W/ 250 OVERRIDE (IP): Performed by: HOSPITALIST

## 2020-10-25 PROCEDURE — A9270 NON-COVERED ITEM OR SERVICE: HCPCS | Performed by: STUDENT IN AN ORGANIZED HEALTH CARE EDUCATION/TRAINING PROGRAM

## 2020-10-25 PROCEDURE — G0475 HIV COMBINATION ASSAY: HCPCS

## 2020-10-25 PROCEDURE — A9270 NON-COVERED ITEM OR SERVICE: HCPCS | Performed by: INTERNAL MEDICINE

## 2020-10-25 PROCEDURE — 71275 CT ANGIOGRAPHY CHEST: CPT

## 2020-10-25 PROCEDURE — 700111 HCHG RX REV CODE 636 W/ 250 OVERRIDE (IP): Performed by: STUDENT IN AN ORGANIZED HEALTH CARE EDUCATION/TRAINING PROGRAM

## 2020-10-25 PROCEDURE — A9270 NON-COVERED ITEM OR SERVICE: HCPCS | Performed by: HOSPITALIST

## 2020-10-25 PROCEDURE — 700102 HCHG RX REV CODE 250 W/ 637 OVERRIDE(OP): Performed by: STUDENT IN AN ORGANIZED HEALTH CARE EDUCATION/TRAINING PROGRAM

## 2020-10-25 PROCEDURE — 36415 COLL VENOUS BLD VENIPUNCTURE: CPT

## 2020-10-25 PROCEDURE — 770020 HCHG ROOM/CARE - TELE (206)

## 2020-10-25 PROCEDURE — 85610 PROTHROMBIN TIME: CPT

## 2020-10-25 PROCEDURE — 99233 SBSQ HOSP IP/OBS HIGH 50: CPT | Performed by: HOSPITALIST

## 2020-10-25 PROCEDURE — 700102 HCHG RX REV CODE 250 W/ 637 OVERRIDE(OP): Performed by: INTERNAL MEDICINE

## 2020-10-25 PROCEDURE — 80053 COMPREHEN METABOLIC PANEL: CPT

## 2020-10-25 PROCEDURE — 700105 HCHG RX REV CODE 258: Performed by: INTERNAL MEDICINE

## 2020-10-25 PROCEDURE — 700102 HCHG RX REV CODE 250 W/ 637 OVERRIDE(OP): Performed by: HOSPITALIST

## 2020-10-25 PROCEDURE — 87502 INFLUENZA DNA AMP PROBE: CPT

## 2020-10-25 PROCEDURE — 700117 HCHG RX CONTRAST REV CODE 255: Performed by: HOSPITALIST

## 2020-10-25 PROCEDURE — 94760 N-INVAS EAR/PLS OXIMETRY 1: CPT

## 2020-10-25 PROCEDURE — 700101 HCHG RX REV CODE 250: Performed by: INTERNAL MEDICINE

## 2020-10-25 RX ORDER — DIPHENHYDRAMINE HYDROCHLORIDE 50 MG/ML
50 INJECTION INTRAMUSCULAR; INTRAVENOUS ONCE
Status: COMPLETED | OUTPATIENT
Start: 2020-10-25 | End: 2020-10-25

## 2020-10-25 RX ORDER — ALUMINA, MAGNESIA, AND SIMETHICONE 2400; 2400; 240 MG/30ML; MG/30ML; MG/30ML
15 SUSPENSION ORAL EVERY 8 HOURS PRN
Status: DISCONTINUED | OUTPATIENT
Start: 2020-10-25 | End: 2020-10-26 | Stop reason: HOSPADM

## 2020-10-25 RX ORDER — FUROSEMIDE 40 MG/1
40 TABLET ORAL
Status: DISCONTINUED | OUTPATIENT
Start: 2020-10-25 | End: 2020-10-26 | Stop reason: HOSPADM

## 2020-10-25 RX ADMIN — ASPIRIN 81 MG: 81 TABLET, COATED ORAL at 17:13

## 2020-10-25 RX ADMIN — ENALAPRIL MALEATE 30 MG: 10 TABLET ORAL at 04:58

## 2020-10-25 RX ADMIN — AMOXICILLIN AND CLAVULANATE POTASSIUM 1 TABLET: 875; 125 TABLET, FILM COATED ORAL at 17:13

## 2020-10-25 RX ADMIN — DEXAMETHASONE SODIUM PHOSPHATE 6 MG: 4 INJECTION, SOLUTION INTRA-ARTICULAR; INTRALESIONAL; INTRAMUSCULAR; INTRAVENOUS; SOFT TISSUE at 04:58

## 2020-10-25 RX ADMIN — REMDESIVIR 100 MG: 5 INJECTION INTRAVENOUS at 18:00

## 2020-10-25 RX ADMIN — ENOXAPARIN SODIUM 40 MG: 40 INJECTION SUBCUTANEOUS at 04:58

## 2020-10-25 RX ADMIN — DOCUSATE SODIUM 50 MG AND SENNOSIDES 8.6 MG 2 TABLET: 8.6; 5 TABLET, FILM COATED ORAL at 04:58

## 2020-10-25 RX ADMIN — ALUMINUM HYDROXIDE, MAGNESIUM HYDROXIDE, AND DIMETHICONE 15 ML: 400; 400; 40 SUSPENSION ORAL at 06:02

## 2020-10-25 RX ADMIN — ALUMINUM HYDROXIDE, MAGNESIUM HYDROXIDE, AND DIMETHICONE 15 ML: 400; 400; 40 SUSPENSION ORAL at 14:44

## 2020-10-25 RX ADMIN — HYDROCORTISONE SODIUM SUCCINATE 200 MG: 100 INJECTION, POWDER, FOR SOLUTION INTRAMUSCULAR; INTRAVENOUS at 15:09

## 2020-10-25 RX ADMIN — AMOXICILLIN AND CLAVULANATE POTASSIUM 1 TABLET: 875; 125 TABLET, FILM COATED ORAL at 04:58

## 2020-10-25 RX ADMIN — DIPHENHYDRAMINE HYDROCHLORIDE 50 MG: 50 INJECTION INTRAMUSCULAR; INTRAVENOUS at 15:09

## 2020-10-25 RX ADMIN — IOHEXOL 59 ML: 350 INJECTION, SOLUTION INTRAVENOUS at 16:24

## 2020-10-25 RX ADMIN — FUROSEMIDE 40 MG: 40 TABLET ORAL at 11:15

## 2020-10-25 ASSESSMENT — ENCOUNTER SYMPTOMS
WEIGHT LOSS: 0
WHEEZING: 0
COUGH: 0
MYALGIAS: 0
BLURRED VISION: 0
DEPRESSION: 0
VOMITING: 0
BACK PAIN: 0
DIARRHEA: 0
HEMOPTYSIS: 0
TINGLING: 0
ABDOMINAL PAIN: 0
SPUTUM PRODUCTION: 0
SHORTNESS OF BREATH: 1
SORE THROAT: 0
NAUSEA: 0
PALPITATIONS: 0
WEAKNESS: 0
POLYDIPSIA: 0
CHILLS: 0
BLOOD IN STOOL: 0
FEVER: 0
HEADACHES: 0
BRUISES/BLEEDS EASILY: 0
DIZZINESS: 0
EYE PAIN: 0
NERVOUS/ANXIOUS: 0

## 2020-10-25 ASSESSMENT — PATIENT HEALTH QUESTIONNAIRE - PHQ9
SUM OF ALL RESPONSES TO PHQ9 QUESTIONS 1 AND 2: 0
1. LITTLE INTEREST OR PLEASURE IN DOING THINGS: NOT AT ALL
2. FEELING DOWN, DEPRESSED, IRRITABLE, OR HOPELESS: NOT AT ALL

## 2020-10-25 NOTE — PROGRESS NOTES
[facility] Medicine Daily Progress Note    Date of Service  10/25/2020    Chief Complaint  50 y.o. male admitted 10/22/2020 with   Hospital Course    50 y.o. male who presented 10/22/2020 with a history of CVA with left upper extremity weakness, hypertension who was admitted for shortness of breath.  Patient reports that his symptoms started less than a day ago.  He reports sudden onset and denies fevers, chills, nausea, vomiting.  He has never had any symptoms like this in the past.  He does report at baseline he does become short of breath but does not use oxygen at home.  He denies cough, sputum production, chest pain.  He denies any sick contacts.  He reports that he has been quarantining at home.  He does drive his wife to work and she works at an [employer] store.  He reports that she has had no sick contacts nor sick coworkers.  In the ED initial Covid test was negative however patient has a an elevated D-dimer, chest x-ray finding showing diffuse bilateral opacities consistent with Covid.        Interval Problem Update  Patient is doing ok this morning, continues to have sob and coughing, continues to be on 15L oxymask  Cbc and bmp pending this morning  Continue decardon  Continue abx      10/25; seen this morning, repeat covid is also neg, will obtain CTA to r/o PE. Also get  Flu swab.  Otherwise he is on oxymask  Continues to have coughing  ID was consulted  Patient is no s/p 1 unit convalescent plasma  hes on remdesivir and steroids as well, along with abx  Consultants/Specialty  ID    Code Status  Full Code    Disposition  tbd    Review of Systems  Review of Systems   Constitutional: Negative for chills, fever and weight loss.   HENT: Negative for hearing loss, sore throat and tinnitus.    Eyes: Negative for blurred vision and pain.   Respiratory: Positive for shortness of breath. Negative for cough, hemoptysis, sputum production and wheezing.    Cardiovascular: Negative for chest pain, palpitations and leg  swelling.   Gastrointestinal: Negative for abdominal pain, blood in stool, diarrhea, nausea and vomiting.   Genitourinary: Negative for dysuria, frequency and hematuria.   Musculoskeletal: Negative for back pain, joint pain and myalgias.   Skin: Negative for itching and rash.   Neurological: Negative for dizziness, tingling, weakness and headaches.   Endo/Heme/Allergies: Negative for polydipsia. Does not bruise/bleed easily.   Psychiatric/Behavioral: Negative for depression. The patient is not nervous/anxious.    All other systems reviewed and are negative.       Physical Exam  Temp:  [36.3 °C (97.4 °F)-37.5 °C (99.5 °F)] 37 °C (98.6 °F)  Pulse:  [] 89  Resp:  [16-28] 17  BP: (141-163)/(63-95) 163/89  SpO2:  [88 %-96 %] 95 %    Physical Exam  Vitals signs and nursing note reviewed.   Constitutional:       General: He is not in acute distress.     Appearance: Normal appearance.   HENT:      Head: Normocephalic and atraumatic.      Nose: Nose normal.      Mouth/Throat:      Mouth: Mucous membranes are moist.      Pharynx: Oropharynx is clear.   Eyes:      Extraocular Movements: Extraocular movements intact.      Pupils: Pupils are equal, round, and reactive to light.   Neck:      Musculoskeletal: Normal range of motion and neck supple. No muscular tenderness.   Cardiovascular:      Rate and Rhythm: Normal rate and regular rhythm.      Pulses: Normal pulses.      Heart sounds: Normal heart sounds. No murmur.   Pulmonary:      Effort: Tachypnea, accessory muscle usage and respiratory distress present.      Breath sounds: Rales present. No wheezing.   Abdominal:      General: Abdomen is flat. Bowel sounds are normal. There is no distension.      Palpations: Abdomen is soft.      Tenderness: There is no abdominal tenderness.   Musculoskeletal: Normal range of motion.         General: No swelling or tenderness.   Skin:     Coloration: Skin is not jaundiced.      Findings: No bruising.   Neurological:      General: No  focal deficit present.      Mental Status: He is alert. Mental status is at baseline.      Cranial Nerves: No cranial nerve deficit.      Comments: Left arm weakness   Psychiatric:         Mood and Affect: Mood normal.         Thought Content: Thought content normal.         Judgment: Judgment normal.         Fluids    Intake/Output Summary (Last 24 hours) at 10/25/2020 1014  Last data filed at 10/25/2020 0500  Gross per 24 hour   Intake 370.25 ml   Output 950 ml   Net -579.75 ml       Laboratory  Recent Labs     10/22/20  1728 10/23/20  0348   WBC 13.0* 10.3   RBC 4.86 4.59*   HEMOGLOBIN 13.8* 13.2*   HEMATOCRIT 39.7* 38.4*   MCV 81.7 83.7   MCH 28.4 28.8   MCHC 34.8 34.4   RDW 41.5 43.2   PLATELETCT 146* 151*   MPV 12.2 12.3     Recent Labs     10/22/20  1728 10/23/20  0348 10/24/20  1348   SODIUM 135 137 132*   POTASSIUM 3.7 4.0 3.9   CHLORIDE 99 105 98   CO2 16* 22 21   GLUCOSE 104* 109* 161*   BUN 17 15 30*   CREATININE 1.44* 1.15 1.36   CALCIUM 8.9 8.2* 9.1     Recent Labs     10/25/20  0053   INR 1.13               Imaging  CT-CHEST (THORAX) W/O   Final Result         Diffuse bilateral groundglass opacities and interstitial opacities most likely infectious/inflammatory etiology. Imaging features can be seen with COVID-19 pneumonia, though are nonspecific and can occur with a variety of infectious and noninfectious    processes.      Mild cardiomegaly.      Borderline enlarged lymph nodes are probably reactive.         DX-CHEST-PORTABLE (1 VIEW)   Final Result      Moderate bilateral pulmonary airspace process which could be infection or edema and Covid pneumonia should be considered in the differential diagnosis      CT-CTA CHEST PULMONARY ARTERY W/ RECONS    (Results Pending)        Assessment/Plan  * Sepsis (HCC)  Assessment & Plan  This is Severe Sepsis Present on admission  SIRS criteria identified on my evaluation include: Tachycardia, with heart rate greater than 90 BPM, Tachypnea, with respirations  greater than 20 per minute and Leukocytosis, with WBC greater than 12,000  Source of infection is pneumonia, suspect COVID  Clinical indicators of end organ dysfunction include Lactate >2 mmol/L (18.0 mg/dL)  Sepsis protocol initiated  Fluid resuscitation ordered per protocol  IV antibiotics as appropriate for source of sepsis  Reassessment: I have reassessed the patient's hemodynamic status  End organ dysfunction include(s):  Acute respiratory failure   azithro and ceftriaxone      Suspected COVID-19 virus infection  Assessment & Plan  Repeat covid test neg, continues remdesivir, s/p 1 unit convalescent plasma  CTA ordered  Flu swab pending  D-Dimer 2.23. prophylactic lovenox ordered  Decadron ordered due to patient needing high flow.    Bilateral pneumonia  Assessment & Plan  CXR show bilateral pneumonia.  Continue abx  See above      Acute respiratory failure with hypoxia (HCC)  Assessment & Plan  Patient transition to high flow.  Persistent hypoxic  Patient has pneumonia likely secondary to Covid. Patient may also have underlying shortness of breath due to COPD.  He does not use oxygen at home.  Continue antibiotics azithromycin and Rocephin  Continue Decadron  ID consulted- started on remdesivir and s/p 1 unit convalescent plasma  Repeat covid test -, will obtain CTA and Flu swab  Ildefonso adding lasix 40mg daily    HTN (hypertension)  Assessment & Plan  Cont home med enalapril. PRN labetalol    H/O: CVA (cerebrovascular accident)  Assessment & Plan  H/o CVA with left sided upper extremity weakness. stable       VTE prophylaxis: lovenox

## 2020-10-25 NOTE — PROGRESS NOTES
-Chart reviewed.  Patient with improvement in oxygen requirements now, down to 6 L oxygen mask.    Lamont Roach is a 50 y.o. 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     Pertinent Diagnoses:  Suspected COVID-19 pneumonia  Acute hypoxemic respiratory failure  History of CVA with residual left-sided upper extremity weakness  Hypertension  Superimposed community-acquired bacterial pneumonia  CKD stage II     Plan:   -SARS-CoV-2 PCR x2 negative. However, patient with quite high oxygen requirements with a relatively disproportionate lack of respiratory distress, also with CT chest revealing extensive diffuse groundglass opacities, all consistent with a clinical diagnosis of COVID-19 pneumonia  -Will look for other etiologies as well with flu swab, HIV  -Patient started on a 10-day course of dexamethasone.  Improvement in oxygen requirements noted  -Agree with empiric course for superimposed community-acquired bacterial pneumonia given elevated procalcitonin  -Follow pending blood cultures from 10/22, no growth till date  -Given high clinical suspicion as above, empirically initiated a 5-day course of IV remdesivir -today is day 2.  Monitor daily LFTs, today's labs are pending  -Patient received 1 unit of convalescent plasma on 10/24  -Patient is on prophylactic Lovenox     Plan was discussed with the primary team, Dr. Hoskins    ID will follow.  Please call with questions.

## 2020-10-25 NOTE — CARE PLAN
Problem: Communication  Goal: The ability to communicate needs accurately and effectively will improve  Outcome: PROGRESSING AS EXPECTED  Note: Pt has been educated on importance for appropriately calling for assistance or needs with call light, any questions/concerns about treatment answered at this time, cleared confusion. Call light close by, will check on patient hourly.  used.    Intervention: [place] patient and significant other/support system to call light to alert staff of needs  Flowsheets (Taken 10/25/2020 1047)  Oriented to:: All of the Following : Location of Bathroom, Visiting Policy, Unit Routine, Call Light and Bedside Controls, Bedside Rail Policy, Smoking Policy, Rights and Responsibilities, Bedside Report, and Patient Education Notebook (Pended)  Intervention: Educate patient and significant other/support system about the plan of care, procedures, treatments, medications and allow for questions  Flowsheets (Taken 10/25/2020 1047)  Pt & Family Have Been Educated on Methods Available to Report Concerns Related to Care, Treatment, Services, and Patient Safety Issues: Yes (Pended)  Intervention: Develop alternate methods of communication with patient and significant other/support system  Note:      Problem: Knowledge Deficit  Goal: Knowledge of disease process/condition, treatment plan, diagnostic tests, and medications will improve  Outcome: PROGRESSING AS EXPECTED  Note: Pt educated on current POC, expected outcomes and goals. All questions and concerns have been answered at this time using the ..    Goal: Knowledge of the prescribed therapeutic regimen will improve  Outcome: PROGRESSING AS EXPECTED     Problem: Respiratory:  Goal: Respiratory status will improve  Outcome: PROGRESSING AS EXPECTED  Note: Pt on 6L oxymask from 15L oxymask overnight. Currently in the low 90's in O2 saturation, educated and encouraged IS, deep breathing and  coughing exercises and mobility in support of respiratory function. Will try to further wean off of supplemental oxygen.

## 2020-10-25 NOTE — PROGRESS NOTES
2 RN Skin Check    2 RN skin check complete. Alda Reeder RN  Devices in place: Oxymask  Skin assessed under devices: yes.  Confirmed pressure ulcers found on: n/a .  New potential pressure ulcers noted on n/a. Wound consult placed N/A.  The following interventions in place Pillows.

## 2020-10-25 NOTE — CARE PLAN
Problem: Communication  Goal: The ability to communicate needs accurately and effectively will improve  Note: Language line used for effective communication.      Problem: Safety  Goal: Will remain free from injury  Note: Pt remains free of falls. Pt uses call light when needing staff assistance.

## 2020-10-26 VITALS
TEMPERATURE: 98.3 F | HEIGHT: 69 IN | RESPIRATION RATE: 18 BRPM | BODY MASS INDEX: 32.85 KG/M2 | SYSTOLIC BLOOD PRESSURE: 171 MMHG | OXYGEN SATURATION: 90 % | DIASTOLIC BLOOD PRESSURE: 86 MMHG | HEART RATE: 87 BPM | WEIGHT: 221.78 LBS

## 2020-10-26 PROBLEM — A41.9 SEPSIS (HCC): Status: RESOLVED | Noted: 2020-10-22 | Resolved: 2020-10-26

## 2020-10-26 PROBLEM — J96.01 ACUTE RESPIRATORY FAILURE WITH HYPOXIA (HCC): Status: RESOLVED | Noted: 2020-10-22 | Resolved: 2020-10-26

## 2020-10-26 PROCEDURE — A9270 NON-COVERED ITEM OR SERVICE: HCPCS | Performed by: HOSPITALIST

## 2020-10-26 PROCEDURE — 700111 HCHG RX REV CODE 636 W/ 250 OVERRIDE (IP): Performed by: STUDENT IN AN ORGANIZED HEALTH CARE EDUCATION/TRAINING PROGRAM

## 2020-10-26 PROCEDURE — 99239 HOSP IP/OBS DSCHRG MGMT >30: CPT | Performed by: HOSPITALIST

## 2020-10-26 PROCEDURE — 700102 HCHG RX REV CODE 250 W/ 637 OVERRIDE(OP): Performed by: STUDENT IN AN ORGANIZED HEALTH CARE EDUCATION/TRAINING PROGRAM

## 2020-10-26 PROCEDURE — A9270 NON-COVERED ITEM OR SERVICE: HCPCS | Performed by: STUDENT IN AN ORGANIZED HEALTH CARE EDUCATION/TRAINING PROGRAM

## 2020-10-26 PROCEDURE — 700102 HCHG RX REV CODE 250 W/ 637 OVERRIDE(OP): Performed by: HOSPITALIST

## 2020-10-26 RX ORDER — DEXAMETHASONE 6 MG/1
6 TABLET ORAL 2 TIMES DAILY
Qty: 12 TAB | Refills: 0 | Status: SHIPPED | OUTPATIENT
Start: 2020-10-26 | End: 2020-11-01

## 2020-10-26 RX ORDER — FUROSEMIDE 40 MG/1
40 TABLET ORAL DAILY
Qty: 10 TAB | Refills: 0 | Status: SHIPPED | OUTPATIENT
Start: 2020-10-27 | End: 2020-11-06

## 2020-10-26 RX ORDER — AMOXICILLIN AND CLAVULANATE POTASSIUM 875; 125 MG/1; MG/1
1 TABLET, FILM COATED ORAL EVERY 12 HOURS
Qty: 6 TAB | Refills: 0 | Status: SHIPPED | OUTPATIENT
Start: 2020-10-26 | End: 2020-10-29

## 2020-10-26 RX ADMIN — FUROSEMIDE 40 MG: 40 TABLET ORAL at 04:48

## 2020-10-26 RX ADMIN — AMOXICILLIN AND CLAVULANATE POTASSIUM 1 TABLET: 875; 125 TABLET, FILM COATED ORAL at 04:47

## 2020-10-26 RX ADMIN — DEXAMETHASONE SODIUM PHOSPHATE 6 MG: 4 INJECTION, SOLUTION INTRA-ARTICULAR; INTRALESIONAL; INTRAMUSCULAR; INTRAVENOUS; SOFT TISSUE at 04:48

## 2020-10-26 RX ADMIN — ENOXAPARIN SODIUM 40 MG: 40 INJECTION SUBCUTANEOUS at 04:48

## 2020-10-26 RX ADMIN — ENALAPRIL MALEATE 30 MG: 10 TABLET ORAL at 04:47

## 2020-10-26 NOTE — CARE PLAN
Problem: Venous Thromboembolism (VTW)/Deep Vein Thrombosis (DVT) Prevention:  Goal: Patient will participate in Venous Thrombosis (VTE)/Deep Vein Thrombosis (DVT)Prevention Measures  Outcome: PROGRESSING AS EXPECTED     Problem: Fluid Volume:  Goal: Will maintain balanced intake and output  Outcome: PROGRESSING AS EXPECTED     Problem: Psychosocial Needs:  Goal: Level of anxiety will decrease  Outcome: Not progressing as expected  Intervention: Collaborate with Interdisciplinary Team including Psychologist/Behavioral Health Team  Note: MD and RN explained to patient his chest CT is highly suspicious for Covid-19 despite negative oropharyngeal swabs.  Educated on importance of completing Remdesivir.  Patient indicates he wants discharged as he does not believe he has Covid-19 and no longer needs Remdesivir.

## 2020-10-26 NOTE — PROGRESS NOTES
With patient’s permission (if able), completed daily phone call to designated support person, name Porsha Tidwell (daughter)  Discussed patient condition and plan of care. All questions answered.

## 2020-10-26 NOTE — PROGRESS NOTES
10/26/2020 1125 Patient left unit via wheelchair with hospital escort, discharge instructions discussed with patient using ; no additional questions at time of discharge. OMID Alegria

## 2020-10-26 NOTE — PROGRESS NOTES
Pt really frustrated and anxious wondering why does he need to be here since he is negative for covid explained to pt using  services that CTA chest showed high risk for covid but still insisting he needs to be out of this unit as he is just exposing himself to for covid. Spoke to Dr. Green over the phone that pt wants to get transferred out of this unit but MD states his CT chest still suspicious for covid but there's a possibility he will dc fadumo since he is no longer needing high o2. Explained that back to pt and is more calm, but still wants to get transferred out tomorrow if he is still staying in the hospital.

## 2020-10-26 NOTE — DISCHARGE INSTRUCTIONS
Discharge Instructions    Discharged to home by car with relative. Discharged via wheelchair, hospital escort: Yes.  Special equipment needed: Not Applicable    Be sure to schedule a follow-up appointment with your primary care doctor or any specialists as instructed.     Discharge Plan: Isolate at home for 2 weeks  Influenza Vaccine Indication: Patient Refuses    I understand that a diet low in cholesterol, fat, and sodium is recommended for good health. Unless I have been given specific instructions below for another diet, I accept this instruction as my diet prescription.     Special Instructions: None    · Is patient discharged on Warfarin / Coumadin?   No     Depression / Suicide Risk    As you are discharged from this [facility] facility, it is important to learn how to keep safe from harming yourself.    Recognize the warning signs:  · Abrupt changes in personality, positive or negative- including increase in energy   · Giving away possessions  · Change in eating patterns- significant weight changes-  positive or negative  · Change in sleeping patterns- unable to sleep or sleeping all the time   · Unwillingness or inability to communicate  · Depression  · Unusual sadness, discouragement and loneliness  · Talk of wanting to die  · Neglect of personal appearance   · Rebelliousness- reckless behavior  · Withdrawal from people/activities they love  · Confusion- inability to concentrate     If you or a loved one observes any of these behaviors or has concerns about self-harm, here's what you can do:  · Talk about it- your feelings and reasons for harming yourself  · Remove any means that you might use to hurt yourself (examples: pills, rope, extension cords, firearm)  · Get professional help from the community (Mental Health, Substance Abuse, psychological counseling)  · Do not be alone:Call your Safe Contact- someone whom you trust who will be there for you.  · Call your local CRISIS HOTLINE 999-9390 or  [phone]  · Call your local Children's Mobile Crisis Response Team [place] (973) 830-2554 or www.[employer]  · Call the toll free National Suicide Prevention Hotlines   · National Suicide Prevention Lifeline 675-081-OKHS (0391)  · National [employer] Line Network 800-SUICIDE (731-4982)    COVID-19: Cómo protegerse y proteger a los demás  COVID-19: How to Protect Yourself and Others  Sepa cómo se propaga  · Actualmente, no existe ninguna vacuna para prevenir la enfermedad por coronavirus 2019 (COVID-19).  · La mejor forma de prevenir la enfermedad es evitar exponerse a britt virus.  · Se archie que el virus se transmite principalmente de anam persona a otra.  ? Entre las personas que están en contacto directo entre sí (a anam distancia inferior a 6 pies [1.80 m]).  ? A través de las gotitas respiratorias producidas cuando anam persona infectada tose, estornuda o habla.  ? Estas gotitas pueden caer en la boca o en la nariz de las personas que están cerca o pueden ser inhaladas hacia los pulmones.  ? Algunos estudios recientes sugieren que la COVID-19 puede ser transmitida por personas que no presentan síntomas.  Lo que todos deben hacer  Límpiese las olegario con frecuencia  · Lávese las olegario con frecuencia con agua y jabón kajal al menos 20 segundos, especialmente después de del estado en un lugar público o después de sonarse la nariz, toser o estornudar.  · Si no dispone de agua y jabón, use un desinfectante de olegario que contenga al menos un 60 % de alcohol. Cubra todas las superficies de las olegario y frótelas hasta que se sientan secas.  · No se toque los ojos, la nariz y la boca sin antes lavarse las olegario.  Evite el contacto cercano  · Quédese en casa si está enfermo.  · Evite el contacto cercano con personas que estén enfermas.  · Establezca distancia entre usted y otras personas.  ? Recuerde que algunas personas que no tienen síntomas pueden transmitir el virus.  ? [place] es especialmente importante para las  personas que tienen más riesgo de enfermarse.www.cdc.gov/coronavirus/2019-ncov/need-extra-precautions/people-at-higher-risk.html  Cúbrase la boca y la nariz con un barbijo de robbie cuando esté cerca de otras personas  · Puede transmitir la COVID-19 a otras personas aunque no se sienta enfermo.  · Todas las personas deben usar un barbijo de robbie cuando tengan que ir a un lugar público, por ejemplo, al supermercado o a buscar otros productos necesarios.  ? Los barbijos de robbie no deben colocarse a niños menores de 2 años de edad, a las personas que tienen problemas respiratorios o que estén inconscientes, incapacitadas o que por algún motivo no puedan quitarse la mascarilla sin ayuda.  · El propósito del barbijo de robbie es proteger a otras personas en joão de que usted esté infectado.  · NO utilice las mascarillas destinadas a los trabajadores de la kenroy.  · Continúe manteniendo anam distancia aproximada de 6 pies (1.80 m) entre usted y otras personas. El barbijo de robbie no reemplaza el distanciamiento social.  Cúbrase al toser y estornudar  · Si está en un ambiente privado y no tiene el barbijo de robbie, recuerde siempre cubrirse la boca y la nariz con un pañuelo descartable al toser o estornudar, o usar el pliegue del codo.  · Deseche los pañuelos descartables usados en la basura.  · Inmediatamente, lávese las olegario con agua y jabón kajal al menos 20 segundos. Si no dispone de agua y jabón, límpiese las olegario con un desinfectante de olegario que contenga al menos un 60 % de alcohol.  Limpie y desinfecte  · Limpie Y desinfecte las superficies que se tocan con frecuencia todos los días. [place] incluye mesas, picaportes, interruptores de hever, encimeras, mangos, escritorios, teléfonos, teclados, inodoros, grifos y lavabos. www.cdc.gov/coronavirus/2019-ncov/prevent-getting-sick/disinfecting-your-home.html  · Si las superficies están sucias, límpielas: Use detergente o jabón y agua antes de la desinfección.  · Luego, use un  desinfectante doméstico. Puede consultar anam lista de los desinfectantes domésticos registrados en la Environmental Protection Agency (EPA) (Agencia de Protección Ambiental) aquí.  cdc.gov/coronavirus  05/05/2020  Esta información no tiene nicolasa fin reemplazar el consejo del médico. Asegúrese de hacerle al médico cualquier pregunta que tenga.  Document Released: 04/23/2020 Document Revised: 05/19/2020 Document Reviewed: 04/14/2020  Elsevier Patient Education © 2020 [employer]  Preguntas frecuentes sobre el COVID-19  COVID-19 Frequently Asked Questions  El COVID-19 (enfermedad por coronavirus) es anam infección causada por anam gran connor de virus. Algunos virus causan enfermedades en las personas y otros causan enfermedades en animales tales nicolasa los camellos, los gatos y los murciélagos. En algunos casos, los virus que causan enfermedades en los animales pueden transmitirse a los seres humanos.  ¿De dónde provino el coronavirus?  En diciembre de 2019, [place] le informó a la Organización Mundial de la Araceli (OMS) acerca de varios casos de enfermedad pulmonar (enfermedad respiratoria humana). Estos casos estaban vinculados con un genao abierto de citlaly de mar y ganado en la ciudad de [place]. El vínculo con el genao de ganado y mariscos sugiere que el virus puede haberse propagado de los animales a los humanos. Sin embargo, desde [place] primer brote en diciembre, también se ha demostrado que el virus se contagia de anam persona a otra.  ¿Cuál es el nombre de la enfermedad y del virus?  Nombre de la enfermedad  Al principio, esta enfermedad se llamó nuevo coronavirus. [place] se debe a que los científicos determinaron que la enfermedad era causada por un nuevo virus respiratorio. La Organización Mundial de la Araceli (OMS) ahora ha dado a la enfermedad el nombre de COVID-19, o enfermedad por coronavirus.  Nombre del virus  El virus causante de la enfermedad se conoce nicolasa coronavirus de tipo 2 causante del síndrome  respiratorio shanda grave (SARS-CoV-2).  Más información sobre el nombre de la enfermedad y el virus  Organización Mundial de la Araceli (OMS): www.who.int/emergencies/diseases/novel-coronavirus-2019/technical-guidance/naming-the-coronavirus-disease-(covid-2019)-and-the-virus-that-causes-it  ¿Quiénes están en riesgo de sufrir complicaciones debido a la enfermedad por coronavirus?  Algunas personas pueden tener un riesgo más alto de tener complicaciones debido a la enfermedad por coronavirus. Entre ellas se encuentran los adultos mayores y las personas que tienen enfermedades crónicas, nicolasa enfermedad cardíaca, diabetes y enfermedad pulmonar.  Si tiene un riesgo más alto de tener complicaciones, tome estas precauciones adicionales:  · Evitar el contacto cercano con personas que estén enfermas o que tengan fiebre o tos. Permanecer al menos a anam distancia de 3 a 6 pies (1-2 m) de las otras personas, si es posible.  · Lavarse las olegario regularmente con agua y jabón kajal al menos 20 segundos.  · Evitar tocarse la seng, la boca, la nariz y los ojos.  · Tener a mano los suministros en olivas casa, nicolasa alimentos, medicamentos y productos de limpieza.  · Permanecer en olivas casa todo lo que sea posible.  · Evitar las reuniones sociales y los viajes.  ¿Cómo se transmite la enfermedad causada por el coronavirus?  El virus que causa la enfermedad por coronavirus se transmite fácilmente de anam persona a otra (es contagioso). También hay casos de enfermedad de transmisión comunitaria. [place] significa que la enfermedad se ha propagado a:  · Personas que no tienen contacto conocido con otras personas infectadas.  · Personas que no gutierrez viajado a zonas donde hay casos conocidos.  Aparentemente, se transmite de anam persona a otra a través de las gotitas que se despiden al toser o al estornudar.  ¿Puedo contraer al virus al tocar superficies u objetos?  Todavía hay mucho que no se conoce acerca del virus que causa la enfermedad por  coronavirus. Los científicos basan gran parte de la información en lo que saben sobre virus similares, por ejemplo:  · En general, los virus no sobreviven en superficies kajal mucho tiempo. Necesitan un cuerpo humano (huésped) para sobrevivir.  · Es más probable que el virus se contagie por contacto cercano con personas que están enfermas (contacto directo), por ejemplo:  ? Al estrechar las olegario o abrazarse.  ? Al inhalar las gotitas respiratorias que se desplazan por el aire. [place] puede ocurrir cuando anam persona infectada tose o estornuda sobre o cerca de otras personas.  · Es menos probable que el virus se propague cuando anam persona toca anam superficie o un objeto sobre el que está el virus (contacto indirecto). El virus puede ingresar al cuerpo si la persona toca anam superficie o un objeto y luego se toca la seng, los ojos, la nariz o la boca.  ¿Anam persona puede contagiar el virus sin tener síntomas de la enfermedad?  Puede ser posible que el virus se contagie antes de que la persona tenga síntomas de la enfermedad, sabi muy probablemente esta no sea la principal forma en que el virus se está propagando. Es más probable que el virus se propague al estar en contacto directo con personas que están enfermas e inhalar las gotas respiratorias que anam persona enferma despide al toser o estornudar.  ¿Cuáles son los síntomas de la enfermedad causada por el coronavirus?  Los síntomas varían de anam persona a otra y pueden variar de leves a graves. Entre los síntomas, se pueden incluir los siguientes:  · Fiebre.  · Tos.  · Cansancio, debilidad o fatiga.  · Respiración rápida o sensación de falta el aire.  Estos síntomas pueden aparecer en el término de 2 a 14 días después de del estado expuesto al virus. Si presenta síntomas, llame al médico. Las personas con síntomas graves pueden necesitar atención hospitalaria.  Si estoy expuesto al virus, ¿cuánto tiempo tardan en aparecer los síntomas?  Los síntomas de la  enfermedad por coronavirus pueden aparecer en cualquier momento en el término de 2 a 14 días después de que anam persona haya estado expuesta al virus. Si presenta síntomas, llame al médico.  ¿Merced hacerme un análisis de detección del virus?  El médico decidirá si debe realizarse un análisis en función de rufus síntomas, antecedentes de exposición y factores de riesgo.  ¿Cómo realiza el médico el análisis para detectar britt virus?  Los médicos obtienen muestras para enviar a analizar. Estas muestras pueden incluir lo siguiente:  · Carlos con un hisopo líquido de la nariz.  · Pedirle que tosa mucosidad (esputo) para extraer líquido de los pulmones en un recipiente estéril.  · Carlos anam muestra de giselle.  · Carlos anam muestra de heces u orina.  ¿Hay algún tratamiento o vacuna para britt virus?  Actualmente, no existe ninguna vacuna para prevenir la enfermedad por coronavirus. Además, no existen medicamentos nicolasa los antibióticos o los antivirales para tratar el virus. Anam persona que se enferma recibe tratamiento de apoyo, lo que significa reposo y líquidos. Anam persona también puede aliviar rufus síntomas con medicamentos de venta sourav para tratar los estornudos, la tos y el goteo nasal. Son los mismos medicamentos que se faisal para el resfrío común.  Si presenta síntomas, llame al médico. Las personas con síntomas graves pueden necesitar atención hospitalaria.  ¿Qué puedo hacer para protegerme y proteger a mi connor de britt virus?         Puede protegerse y proteger a olivas connor tomando las mismas medidas que tomaría para prevenir el contagio de otros virus. [place] las siguientes medidas:  · Lavarse las olegario regularmente con agua y jabón kajal al menos 20 segundos. Usar desinfectante para olegario con alcohol si no dispone de agua y jabón.  · Evitar tocarse la seng, la boca, la nariz y los ojos.  · Toser o estornudar en un pañuelo descartable, sobre olivas manga o codo. No toser o estornudar al aire ni cubrirse con la  mano.  ? Si tose o estornuda en un pañuelo de papel, deséchelo inmediatamente y lávese las olegario.  · Desinfectar los objetos y las superficies que se tocan con frecuencia todos los días.  · Evitar el contacto cercano con personas que estén enfermas o que tengan fiebre o tos. Permanecer al menos a anam distancia de 3 a 6 pies (1-2 m) de las otras personas, si es posible.  · Quedarse en olivas casa si está enfermo, excepto para obtener atención médica. Llame al médico antes de buscar atención médica.  · Asegúrese de tener las vacunas al día. Pregúntele al médico qué vacunas necesita.  ¿Qué tejas hacer si tengo que viajar?  Siga las recomendaciones relacionadas con los viajes de la autoridad de kenroy local, los CDC y la OMS.  Información y consejos para viajeros  · Centers for Disease Control and Prevention (CDC) (Centros para el Control y la Prevención de Enfermedades): www.cdc.gov/coronavirus/2019-ncov/travelers/index.html  · Organización Mundial de la [place] (OMS): www.who.int/emergencies/diseases/novel-coronavirus-2019/travel-advice  Conozca los riesgos y tome medidas para proteger olivas kenroy  · El riesgo de contraer la enfermedad por coronavirus es más alto si viaja a zonas con un brote o si está en contacto con viajeros que provienen de zonas donde hay un brote.  · Lávese las olegario con frecuencia y mantenga anam higiene adecuada para reducir el riesgo de contagiarse o transmitir el virus.  ¿Qué tejas hacer si estoy enfermo?  Instrucciones generales para detener la propagación de la infección  · Lavarse las olegario regularmente con agua y jabón kajal al menos 20 segundos. Usar desinfectante para olegario con alcohol si no dispone de agua y jabón.  · Toser o estornudar en un pañuelo descartable, sobre olivas manga o codo. No toser o estornudar al aire ni cubrirse con la mano.  · Si tose o estornuda en un pañuelo de papel, deséchelo inmediatamente y lávese las olegario.  · Quédese en olivas casa a menos que deba recibir atención médica.  Llame al médico o a la autoridad de ken[place] local antes de buscar atención médica.  · Evite las zonas públicas. No viaje en transporte público, de ser posible.  · Si puede, use un barbijo si debe salir de la casa o si está en contacto cercano con alguien que no está enfermo.  Mantenga olivas casa limpia  · Desinfecte los objetos y las superficies que se tocan con frecuencia todos los días. Pueden incluir:  ? Encimeras y mesas.  ? Picaportes e interruptores de hever.  ? Lavabos, fregaderos y grifos.  ? Aparatos electrónicos tales nicolasa teléfonos, controles remotos, teclados, computadoras y tabletas.  · Lave los platos con agua jabonosa caliente o en el lavavajillas. Deje los platos para que se sequen al aire.  · Lave la ropa con [place].  Evite infectar a otros miembros de la connor  · Permita que los miembros de la connor sanos cuiden a los niños y las mascotas, si es posible. Si tiene que cuidar a los niños o las mascotas, lávese las olegario con frecuencia y use un barbijo.  · Duerma en anam habitación o cama diferentes, si es posible.  · No comparta elementos personales, nicolasa afeitadoras, cepillos de dientes, desodorantes, peines, cepillos, toallas y toallitas de mano.  Dónde buscar más información  Centers for Disease Control and Prevention (CDC)  · Actualizaciones de información y novedades: www.cdc.gov/coronavirus/2019-ncov  Organización Mundial de la [place] (OMS)  · Actualizaciones de información y novedades: www.who.int/emergencies/diseases/novel-coronavirus-2019  · Rodríguez de kenroy relacionado con el coronavirus: www.who.int/health-topics/coronavirus  · Preguntas y respuestas sobre COVID-19: www.who.int/news-room/q-a-detail/k-x-uhtocmljbrnro  · Registro mundial: who.sprinklr.com  American Academy of Pediatrics (AAP) (Academia Estadounidense de Pediatría)  · Información para familias: www.healthychildren.org/English/health-issues/conditions/chest-lungs/Pages/2019-Novel-Coronavirus.aspx  La situación del coronavirus  cambia rápidamente. Consulte el sitio web de olivas autoridad de kenroy local o los sitios web de los CDC y la OMS para enterarse de las novedades y noticias.  ¿Cuándo tejas comunicarme con un médico?  · Comuníquese con olivas médico si tiene síntomas de infección, nicolasa fiebre o tos, y:  ? Ha estado cerca de alguien que sabe que tiene la enfermedad por coronavirus.  ? Ha estado en contacto con anam persona que presuntamente sufra de la enfermedad por coronavirus.  ? Ha viajado fuera del país.  ¿Cuándo tejas buscar asistencia médica inmediata?  · Busque ayuda de inmediato llamando al servicio de emergencias de olivas localidad (911 en los [place]) si tiene lo siguiente:  ? Dificultad para respirar.  ? Dolor u opresión en el pecho.  ? Confusión.  ? Labios y uñas de color azulado.  ? Dificultad para despertarse.  ? Síntomas que empeoran.  Informe al personal médico de emergencias si archie que tiene la enfermedad por coronavirus.  Resumen  · Un nuevo virus respiratorio se propaga de anam persona a otra y causa COVID-19 (enfermedad por coronavirus).  · El virus que causa el COVID-19 parece diseminarse fácilmente. Se transmite de anam persona a otra a través de las gotitas que se despiden al toser o al estornudar.  · Los adultos mayores y las personas que tienen enfermedades crónicas tienen mayor riesgo de contraer la enfermedad. Si tiene un riesgo más alto de tener complicaciones, tome precauciones adicionales.  · Actualmente, no existe ninguna vacuna para prevenir la enfermedad por coronavirus. No existen medicamentos, nicolasa los antibióticos o los antivirales, para tratar el virus.  · Puede protegerse y proteger a olivas connor al lavarse las olegario con frecuencia, evitar tocarse la seng y cubrirse al toser y estornudar.  Esta información no tiene nicolasa fin reemplazar el consejo del médico. Asegúrese de hacerle al médico cualquier pregunta que tenga.  Document Released: 04/27/2020 Document Revised: 04/27/2020 Document Reviewed:  04/27/2020  Elsevier Patient Education © 2020 [employer] Inc.  COVID-19  COVID-19  La COVID-19 es anam infección respiratoria causada por un virus llamado coronavirus tipo 2 causante del síndrome respiratorio shanda grave (SARS-CoV-2). La enfermedad también se conoce nicolasa enfermedad por coronavirus o nuevo coronavirus. En algunas personas, el virus puede no ocasionar síntomas. En otras, puede producir anam infección grave. La infección puede empeorar rápidamente y causar complicaciones, nicolasa:  · Neumonía o infección en los pulmones.  · Síndrome de dificultad respiratoria aguda o SDRA. Se trata de la acumulación de líquido en los pulmones.  · Insuficiencia respiratoria aguda. Se trata de anam afección en la que no pasa suficiente oxígeno de los pulmones al cuerpo.  · Sepsis o choque séptico. Se trata de anam reacción grave del cuerpo ante anam infección.  · Problemas de coagulación.  · Infecciones secundarias debido a bacterias u hongos.  El virus que causa la COVID-19 es contagioso. [place] significa que puede transmitirse de anam persona a otra a través de las gotitas de saliva de la tos y de los estornudos (secreciones respiratorias).  ¿Cuáles son las causas?  Esta enfermedad es causada por un virus. Usted puede contagiarse con britt virus:  · Al aspirar las gotitas que anam persona infectada elimina al toser o estornudar.  · Al tocar algo, nicolasa anam paulson o el picaportes de anam anjali, que estuvo expuesto al virus (contaminado) y luego tocarse la boca, nariz o los ojos.  ¿Qué incrementa el riesgo?  Riesgo de infección  Es más probable que se infecte con britt virus si:  · Vive o viaja a anam kumar donde hay un brote de COVID-19.  · Entra en contacto con anam persona enferma que recientemente viajó a anam kumar con un brote de COVID-19.  · Cuida o vive con anam persona infectada con COVID-19.  Riesgo de enfermedad grave  Es más probable que se enferme gravemente por el virus si:  · Tiene 65 años o más.  · Tiene anam enfermedad crónica  que disminuye la capacidad del cuerpo para combatir las infecciones (immunocomprometido).  · Vive en un hogar de ancianos o centro de atención a savita plazo.  · Tiene anam enfermedad prolongada (crónica), nicolasa las siguientes:  ? Enfermedad pulmonar crónica, que incluye la enfermedad pulmonar obstructiva crónica o asma.  ? Enfermedad cardíaca.  ? Diabetes.  ? Enfermedad renal crónica.  ? Enfermedad hepática.  · Es seymour.  ¿Cuáles son los signos o síntomas?  Los síntomas de esta afección pueden ser de leves a graves. Los síntomas pueden aparecer en el término de 2 a 14 días después de del estado expuesto al virus. Incluyen los siguientes:  · Fiebre.  · Tos.  · Dificultad para respirar.  · Escalofríos.  · Yennifer musculares.  · Dolor de garganta.  · Pérdida del gusto o el olfato.  Algunas personas también pueden tener problemas estomacales, nicolasa náuseas, vómitos o diarrea.  Es posible que otras personas no tengan síntomas de COVID-19.  ¿Cómo se diagnostica?  Esta afección se puede diagnosticar en función de lo siguiente:  · Janel signos y síntomas, especialmente si:  ? Vive en anam kumar donde hay un brote de COVID-19.  ? Viajó recientemente a anam kumar donde el virus es frecuente.  ? Cuida o vive con anam persona a quien se le diagnosticó COVID-19.  · Un examen físico.  · Análisis de laboratorio que pueden incluir:  ? Un hisopado nasal para meche anam muestra de líquido de la nariz.  ? Un hisopado de garganta para meche anam muestra de líquido de la garganta.  ? Anam muestra de mucosidad de los pulmones (esputo).  ? Análisis de giselle.  · Los estudios de diagnóstico por imágenes pueden incluir radiografías, exploración por tomografía computarizada (TC) o ecografía.  ¿Cómo se trata?  En britt momento, no hay ningún medicamento para tratar la COVID-19. Los medicamentos para tratar otras enfermedades se usan a modo de ensayo para comprobar si son eficaces contra la COVID-19.  El médico le informará sobre las maneras de tratar los  síntomas. En la mayoría de las personas, la infección es leve y puede controlarse en el hogar con reposo, líquidos y medicamentos de venta sourav.  El tratamiento para anam infección grave suele realizarse en la unidad de cuidados intensivos (UCI) de un hospital. Puede incluir kwaku o más de los siguientes. Estos tratamientos se administran hasta que los síntomas mejoran.  · Recibir líquidos y medicamentos a través de anam vía intravenosa.  · Oxígeno complementario. Para administrar oxígeno extra, se utiliza un tubo en la nariz, anam mascarilla o anam gale de oxígeno.  · Colocarlo para que se recueste boca abajo (decúbito prono). [place] facilita el ingreso de oxígeno a los pulmones.  · Uso continuo de anam máquina de presión positiva de las vías aéreas (CPAP) o de presión positiva de las vías aéreas de dos niveles (BPAP). Britt tratamiento utiliza anam presión de aire leve para mantener las vías respiratorias abiertas. Un tubo conectado a un motor administra oxígeno al cuerpo.  · Respirador. Britt tratamiento mueve el aire dentro y fuera de los pulmones mediante el uso de un tubo que se coloca en la tráquea.  · Traqueostomía. En britt procedimiento se hace un orificio en el michael para insertar un tubo de respiración.  · Oxigenación por membrana extracorpórea (OMEC). En britt procedimiento, los pulmones tienen la posibilidad de recuperarse al asumir las funciones del corazón y los pulmones. Suministra oxígeno al cuerpo y elimina el dióxido de carbono.  Siga estas instrucciones en olivas casa:  Estilo de hermelindo  · Si está enfermo, quédese en olivas casa, excepto para obtener atención médica. El médico le indicará cuánto tiempo debe quedarse en casa. Llame al médico antes de buscar atención médica.  · Chris reposo en olivas casa nicolasa se lo haya indicado el médico.  · No consuma ningún producto que contenga nicotina o tabaco, nicolasa cigarrillos, cigarrillos electrónicos y tabaco de mascar. Si necesita ayuda para dejar de fumar, consulte al  médico.  · Retome rufus actividades normales nicolasa se lo haya indicado el médico. Pregúntele al médico qué actividades son seguras para usted.  Instrucciones generales  · Use los medicamentos de venta sourav y los recetados solamente nicolasa se lo haya indicado el médico.  · Telma suficiente líquido nicolasa para mantener la orina de color amarillo pálido.  · Concurra a todas las visitas de seguimiento nicolasa se lo haya indicado el médico. [place] es importante.  ¿Cómo se fred?    No hay ninguna vacuna que ayude a prevenir la infección por la COVID-19. Sin embargo, hay medidas que puede meche para protegerse y proteger a otras personas de britt virus.  Para protegerse:   · No viaje a zonas donde la COVID-19 sea un riesgo. Las zonas donde se informa la presencia de la COVID-19 cambian con frecuencia. Para identificar las zonas de alto riesgo y las restricciones de viaje, consulte el sitio web de viajes de los Centers for Disease Control and Prevention (CDC) (Centros para el Control y la Prevención de Enfermedades): wwwnc.cdc.gov/travel/notices  · Si vive o debe viajar a anam kumar donde COVID-19 es un riesgo, tome precauciones para evitar infecciones.  ? Aléjese de las personas enfermas.  ? Lávese las olegario frecuentemente con agua y jabón kajal 20 segundos. Use desinfectante para olegario con alcohol si no dispone de agua y jabón.  ? Evite tocarse la boca, la seng, los ojos o la nariz.  ? Evite salir de olivas casa, siga las indicaciones de olivas estado y de las autoridades sanitarias locales.  ? Si debe salir de olivas casa, use un barbijo de robbie o anam mascarilla facial.  ? Desinfecte los objetos y las superficies que se tocan con frecuencia todos los días. Pueden incluir:  § Encimeras y mesas.  § Picaportes e interruptores de hever.  § Lavabos, fregaderos y grifos.  § Aparatos electrónicos tales nicolasa teléfonos, controles remotos, teclados, computadoras y tabletas.  Cómo proteger a los demás:  Si tiene síntomas de la COVID-19, tome medidas para  evitar que el virus se propague a otras personas.  · Si archie que tiene anam infección por la COVID-19, comuníquese de inmediato con olivas médico. Informe al equipo de atención médica que archie que puede tener anam infección por la COVID-19.  · Quédese en olivas casa. Salga de olivas casa solo para buscar atención médica. No utilice el transporte público.  · No viaje mientras esté enfermo.  · Lávese las olegario frecuentemente con agua y jabón kajal 20 segundos. Usar desinfectante para olegario con alcohol si no dispone de agua y jabón.  · Manténgase alejado de quienes vivan con usted. Permita que los miembros de la connor sanos cuiden a los niños y las mascotas, si es posible. Si tiene que cuidar a los niños o las mascotas, lávese las olegario con frecuencia y use un barbijo. Si es posible, permanezca en olivas habitación, separado de los demás. Utilice un baño diferente.  · Asegúrese de que todas las personas que viven en olivas casa se laven kota las olegario y con frecuencia.  · Tosa o estornude en un pañuelo de papel o sobre olivas manga o codo. No tosa o estornude al aire ni se cubra la boca o la nariz con la mano.  · Use un barbijo de robbie o anam mascarilla facial.  Dónde buscar más información  · Centers for Disease Control and Prevention (Centros para el Control y la Prevención de Enfermedades): www.cdc.gov/coronavirus/2019-ncov/index.html  · World Health Organization (Organización Mundial de la Araceli): www.who.int/health-topics/coronavirus  Comuníquese con un médico si:  · Vive o ha viajado a anam kumar donde la COVID-19 es un riesgo y tiene síntomas de infección.  · Ha tenido contacto con alguien que tiene COVID-19 y usted tiene síntomas de infección.  Solicite ayuda de inmediato si:  · Tiene dificultad para respirar.  · Siente dolor u opresión en el pecho.  · Experimenta confusión.  · Tiene las uñas de los dedos y los labios de color azulado.  · Tiene dificultad para despertarse.  · Los síntomas empeoran.  Estos síntomas pueden representar un  problema grave que constituye anam emergencia. No espere a neil si los síntomas desaparecen. Solicite atención médica de inmediato. Comuníquese con el servicio de emergencias de olivas localidad (911 en los [place]). No conduzca por rufus propios medios hasta el hospital. Informe al personal médico de emergencias si archie que tiene COVID-19.  Resumen  · La COVID-19 es anam infección respiratoria causada por un virus. También se conoce nicolasa enfermedad por coronavirus o nuevo coronavirus. Puede causar infecciones graves, nicolasa neumonía, síndrome de dificultad respiratoria aguda, insuficiencia respiratoria aguda o sepsis.  · El virus que causa la COVID-19 es contagioso. [place] significa que puede transmitirse de anam persona a otra a través de las gotitas de saliva de la tos y de los estornudos.  · Es más probable que desarrolle anam enfermedad grave si tiene 65 años o más, tiene un sistema inmunitario débil, vive en un hogar de ancianos o tiene enfermedad crónica.  · No hay ningún medicamento para tratar la COVID-19. El médico le informará sobre las maneras de tratar los síntomas.  · [place] medidas para protegerse y proteger a los demás contra las infecciones. Lávese las olegario con frecuencia y desinfecte los objetos y las superficies que se tocan con frecuencia todos los días. Manténgase alejado de las personas que estén enfermas y use un barbijo si está enfermo.  Esta información no tiene nicolasa fin reemplazar el consejo del médico. Asegúrese de hacerle al médico cualquier pregunta que tenga.  Document Released: 02/15/2020 Document Revised: 05/19/2020 Document Reviewed: 02/15/2020  Elsevier Patient Education © 2020 [employer]

## 2020-10-26 NOTE — PROGRESS NOTES
Brief ID note:    Chart reviewed.  Patient afebrile.  Patient now on room air.  Clinically improving.    Initial SARS-CoV-2 PCR-nondetectable.  However given high clinical suspicion with high oxygen requirements initially, patient was started on a 5-day course of IV remdesivir  Repeat SARS-CoV-2 PCR- negative  Influenza a/B PCR-negative  CT scan of the chest reveals diffuse bilateral groundglass opacities and interstitial opacities  CTA chest-negative for pulmonary embolism    Plan:  -Recommend continuing a 5-day course of IV remdesivir.  Stop date 10/29/2020  -Monitor CMP while on remdesivir.  LFTs within normal limits today  -On 10-day course of dexamethasone 6 mg daily  -Continue 5-day course of empiric antibiotics for possible superimposed community-acquired bacterial pneumonia given elevated procalcitonin.  -Patient received plasma on 10/24/2020.      Plan of care discussed with hospitalist, Dr. Hoskins.  Signing off.  Please reconsult if needed

## 2020-10-26 NOTE — DISCHARGE SUMMARY
Discharge Summary    CHIEF COMPLAINT ON ADMISSION  Chief Complaint   Patient presents with   • Shortness of Breath       Reason for Admission  Fever, Breathing Concern     Admission Date  10/22/2020    CODE STATUS  Full Code    HPI & HOSPITAL COURSE    As per admission HPI:  50 y.o. male who presented 10/22/2020 with a history of CVA with left upper extremity weakness, hypertension who was admitted for shortness of breath.  Patient reports that his symptoms started less than a day ago.  He reports sudden onset and denies fevers, chills, nausea, vomiting.  He has never had any symptoms like this in the past.  He does report at baseline he does become short of breath but does not use oxygen at home.  He denies cough, sputum production, chest pain.  He denies any sick contacts.  He reports that he has been quarantining at home.  He does drive his wife to work and she works at an [employer] store.  He reports that she has had no sick contacts nor sick coworkers.  In the ED initial Covid test was negative however patient has a an elevated D-dimer, chest x-ray finding showing diffuse bilateral opacities consistent with Covid.    Patient was admitted and required high flow O2 to maintain stats. ID was consulted  cxr imaging was concerning for covid findings    .     Initial SARS-CoV-2 PCR-nondetectable.  However given high clinical suspicion with high oxygen requirements initially, patient was started on a 5-day course of IV remdesivir  Repeat SARS-CoV-2 PCR- negative  Influenza a/B PCR-negative  CT scan of the chest reveals diffuse bilateral groundglass opacities and interstitial opacities  CTA chest-negative for pulmonary embolism     ID Recommended continuing a 5-day course of IV remdesivir. However patient insisted that if his test is neg then either ge get moved to a different unit in the hospital or [place] home because he believes he does not have covid. He was successfully weaned off O2, LFTS were stable, he was continues on  On 10-day course of dexamethasone 6 mg daily  ; he was also Continued on empiric antibiotics for possible superimposed community-acquired bacterial pneumonia given elevated procalcitonin.  -Patient received plasma on 10/24/2020.      At this time I discussed with patient that although his tests are negative the imaging highly suggests Covid, therefore we can not move him to a different non covid floor and we have to trake proper covid precautions,  Patient requesting to go home since he is not on O2 anymore, I discussed with ID and they are ok with the dc as long as he gets his abx and steroids course completed. I explained to patient using the ipad  and will dc on abx and steroids. ER precautions given    Covid quarantine precautions given, encouraged to quarantine for 14 days after dc    Patient understood and agreed with the above plan          Therefore, he is discharged in good and stable condition to home with close outpatient follow-up.    The patient met 2-midnight criteria for an inpatient stay at the time of discharge.    Discharge Date  10/26/20    FOLLOW UP ITEMS POST DISCHARGE  pcp    DISCHARGE DIAGNOSES  Principal Problem (Resolved):    Sepsis (HCC) POA: Unknown  Active Problems:    Bilateral pneumonia POA: Unknown    Suspected COVID-19 virus infection POA: Unknown    H/O: CVA (cerebrovascular accident) POA: Unknown    HTN (hypertension) POA: Unknown  Resolved Problems:    Acute respiratory failure with hypoxia (HCC) POA: Unknown      FOLLOW UP  No future appointments.  No follow-up provider specified.    MEDICATIONS ON DISCHARGE     Medication List      START taking these medications      Instructions   amoxicillin-clavulanate 875-125 MG Tabs  Commonly known as: AUGMENTIN   Take 1 Tab by mouth every 12 hours for 3 days.  Dose: 1 Tab     dexamethasone 6 MG Tabs  Commonly known as: DECADRON   Take 1 Tab by mouth 2 times a day for 6 days.  Dose: 6 mg     furosemide 40 MG Tabs  Start taking on:  October 27, 2020  Commonly known as: LASIX   Take 1 Tab by mouth every day for 10 days.  Dose: 40 mg        CONTINUE taking these medications      Instructions   amLODIPine 5 MG Tabs  Commonly known as: NORVASC   Take 5 mg by mouth every day.  Dose: 5 mg     aspirin EC 81 MG Tbec  Commonly known as: ECOTRIN   Take 81 mg by mouth every day.  Dose: 81 mg     enalapril 20 MG tablet  Commonly known as: VASOTEC   Take 30 mg by mouth.  Dose: 30 mg     Maalox Advanced Max St 400-400-40 MG/5ML Susp  Generic drug: mag hydrox-al hydrox-simeth   Take 10 mL by mouth 4 times a day as needed.  Dose: 10 mL     VITAMIN B-3 PO   Take 8,000 Int'l Units by mouth every day.  Dose: 8,000 Int'l Units            Allergies  No Known Allergies    DIET  Orders Placed This Encounter   Procedures   • Diet Order Regular     Standing Status:   Standing     Number of Occurrences:   1     Order Specific Question:   Diet:     Answer:   Regular [1]       ACTIVITY  As tolerated.  Weight bearing as tolerated    CONSULTATIONS  none    PROCEDURES  none    LABORATORY  Lab Results   Component Value Date    SODIUM 133 (L) 10/25/2020    POTASSIUM 3.8 10/25/2020    CHLORIDE 98 10/25/2020    CO2 24 10/25/2020    GLUCOSE 156 (H) 10/25/2020    BUN 31 (H) 10/25/2020    CREATININE 1.37 10/25/2020        Lab Results   Component Value Date    WBC 10.3 10/23/2020    HEMOGLOBIN 13.2 (L) 10/23/2020    HEMATOCRIT 38.4 (L) 10/23/2020    PLATELETCT 151 (L) 10/23/2020        Total time of the discharge process exceeds 45 minutes.

## 2020-10-27 LAB
BACTERIA BLD CULT: NORMAL
BACTERIA BLD CULT: NORMAL
SIGNIFICANT IND 70042: NORMAL
SIGNIFICANT IND 70042: NORMAL
SITE SITE: NORMAL
SITE SITE: NORMAL
SOURCE SOURCE: NORMAL
SOURCE SOURCE: NORMAL

## 2021-09-15 ENCOUNTER — APPOINTMENT (OUTPATIENT)
Dept: RADIOLOGY | Facility: MEDICAL CENTER | Age: 51
DRG: 196 | End: 2021-09-15
Attending: EMERGENCY MEDICINE
Payer: MEDICARE

## 2021-09-15 ENCOUNTER — HOSPITAL ENCOUNTER (INPATIENT)
Facility: MEDICAL CENTER | Age: 51
LOS: 2 days | DRG: 196 | End: 2021-09-17
Attending: EMERGENCY MEDICINE | Admitting: HOSPITALIST
Payer: MEDICARE

## 2021-09-15 DIAGNOSIS — R65.10 SIRS (SYSTEMIC INFLAMMATORY RESPONSE SYNDROME) (HCC): ICD-10-CM

## 2021-09-15 DIAGNOSIS — R06.02 SOB (SHORTNESS OF BREATH): ICD-10-CM

## 2021-09-15 DIAGNOSIS — J84.9 CHRONIC INTERSTITIAL LUNG DISEASE (HCC): ICD-10-CM

## 2021-09-15 DIAGNOSIS — R01.1 NEWLY RECOGNIZED HEART MURMUR: ICD-10-CM

## 2021-09-15 DIAGNOSIS — R06.03 ACUTE RESPIRATORY DISTRESS: ICD-10-CM

## 2021-09-15 DIAGNOSIS — R09.02 HYPOXIA: ICD-10-CM

## 2021-09-15 PROBLEM — Z20.822 SUSPECTED COVID-19 VIRUS INFECTION: Status: RESOLVED | Noted: 2020-10-22 | Resolved: 2021-09-15

## 2021-09-15 LAB
ALBUMIN SERPL BCP-MCNC: 3 G/DL (ref 3.2–4.9)
ALBUMIN/GLOB SERPL: 0.8 G/DL
ALP SERPL-CCNC: 93 U/L (ref 30–99)
ALT SERPL-CCNC: 9 U/L (ref 2–50)
ANION GAP SERPL CALC-SCNC: 10 MMOL/L (ref 7–16)
AST SERPL-CCNC: 15 U/L (ref 12–45)
BASOPHILS # BLD AUTO: 0.6 % (ref 0–1.8)
BASOPHILS # BLD: 0.04 K/UL (ref 0–0.12)
BILIRUB SERPL-MCNC: 0.7 MG/DL (ref 0.1–1.5)
BUN SERPL-MCNC: 26 MG/DL (ref 8–22)
CALCIUM SERPL-MCNC: 8.4 MG/DL (ref 8.5–10.5)
CHLORIDE SERPL-SCNC: 108 MMOL/L (ref 96–112)
CO2 SERPL-SCNC: 23 MMOL/L (ref 20–33)
CREAT SERPL-MCNC: 1.37 MG/DL (ref 0.5–1.4)
CRP SERPL HS-MCNC: 20.89 MG/DL (ref 0–0.75)
D DIMER PPP IA.FEU-MCNC: 1.31 UG/ML (FEU) (ref 0–0.5)
EKG IMPRESSION: NORMAL
EOSINOPHIL # BLD AUTO: 0.27 K/UL (ref 0–0.51)
EOSINOPHIL NFR BLD: 3.7 % (ref 0–6.9)
ERYTHROCYTE [DISTWIDTH] IN BLOOD BY AUTOMATED COUNT: 43.4 FL (ref 35.9–50)
ERYTHROCYTE [SEDIMENTATION RATE] IN BLOOD BY WESTERGREN METHOD: 131 MM/HOUR (ref 0–20)
FERRITIN SERPL-MCNC: 716 NG/ML (ref 22–322)
FLUAV RNA SPEC QL NAA+PROBE: NEGATIVE
FLUBV RNA SPEC QL NAA+PROBE: NEGATIVE
GLOBULIN SER CALC-MCNC: 3.9 G/DL (ref 1.9–3.5)
GLUCOSE SERPL-MCNC: 99 MG/DL (ref 65–99)
HCT VFR BLD AUTO: 35.7 % (ref 42–52)
HGB BLD-MCNC: 12.5 G/DL (ref 14–18)
IMM GRANULOCYTES # BLD AUTO: 0.02 K/UL (ref 0–0.11)
IMM GRANULOCYTES NFR BLD AUTO: 0.3 % (ref 0–0.9)
LACTATE BLD-SCNC: 1.5 MMOL/L (ref 0.5–2)
LACTATE BLD-SCNC: 1.5 MMOL/L (ref 0.5–2)
LDH SERPL L TO P-CCNC: 718 U/L (ref 107–266)
LYMPHOCYTES # BLD AUTO: 1.14 K/UL (ref 1–4.8)
LYMPHOCYTES NFR BLD: 15.7 % (ref 22–41)
MCH RBC QN AUTO: 30.3 PG (ref 27–33)
MCHC RBC AUTO-ENTMCNC: 35 G/DL (ref 33.7–35.3)
MCV RBC AUTO: 86.4 FL (ref 81.4–97.8)
MONOCYTES # BLD AUTO: 0.54 K/UL (ref 0–0.85)
MONOCYTES NFR BLD AUTO: 7.4 % (ref 0–13.4)
NEUTROPHILS # BLD AUTO: 5.26 K/UL (ref 1.82–7.42)
NEUTROPHILS NFR BLD: 72.3 % (ref 44–72)
NRBC # BLD AUTO: 0 K/UL
NRBC BLD-RTO: 0 /100 WBC
NT-PROBNP SERPL IA-MCNC: 520 PG/ML (ref 0–125)
PLATELET # BLD AUTO: 142 K/UL (ref 164–446)
PMV BLD AUTO: 11.8 FL (ref 9–12.9)
POTASSIUM SERPL-SCNC: 3.3 MMOL/L (ref 3.6–5.5)
PROCALCITONIN SERPL-MCNC: 0.29 NG/ML
PROCALCITONIN SERPL-MCNC: 0.41 NG/ML
PROT SERPL-MCNC: 6.9 G/DL (ref 6–8.2)
RBC # BLD AUTO: 4.13 M/UL (ref 4.7–6.1)
RSV RNA SPEC QL NAA+PROBE: NEGATIVE
SARS-COV-2 RNA RESP QL NAA+PROBE: NOTDETECTED
SODIUM SERPL-SCNC: 141 MMOL/L (ref 135–145)
SPECIMEN SOURCE: NORMAL
TROPONIN T SERPL-MCNC: 23 NG/L (ref 6–19)
TROPONIN T SERPL-MCNC: 27 NG/L (ref 6–19)
WBC # BLD AUTO: 7.3 K/UL (ref 4.8–10.8)

## 2021-09-15 PROCEDURE — 83516 IMMUNOASSAY NONANTIBODY: CPT | Mod: 91

## 2021-09-15 PROCEDURE — 0241U HCHG SARS-COV-2 COVID-19 NFCT DS RESP RNA 4 TRGT MIC: CPT

## 2021-09-15 PROCEDURE — 96375 TX/PRO/DX INJ NEW DRUG ADDON: CPT

## 2021-09-15 PROCEDURE — 700105 HCHG RX REV CODE 258: Performed by: EMERGENCY MEDICINE

## 2021-09-15 PROCEDURE — 83615 LACTATE (LD) (LDH) ENZYME: CPT

## 2021-09-15 PROCEDURE — 36415 COLL VENOUS BLD VENIPUNCTURE: CPT

## 2021-09-15 PROCEDURE — 71045 X-RAY EXAM CHEST 1 VIEW: CPT

## 2021-09-15 PROCEDURE — 700102 HCHG RX REV CODE 250 W/ 637 OVERRIDE(OP): Performed by: STUDENT IN AN ORGANIZED HEALTH CARE EDUCATION/TRAINING PROGRAM

## 2021-09-15 PROCEDURE — 84145 PROCALCITONIN (PCT): CPT

## 2021-09-15 PROCEDURE — 83605 ASSAY OF LACTIC ACID: CPT

## 2021-09-15 PROCEDURE — 87040 BLOOD CULTURE FOR BACTERIA: CPT

## 2021-09-15 PROCEDURE — 86140 C-REACTIVE PROTEIN: CPT

## 2021-09-15 PROCEDURE — C9803 HOPD COVID-19 SPEC COLLECT: HCPCS | Performed by: EMERGENCY MEDICINE

## 2021-09-15 PROCEDURE — 85652 RBC SED RATE AUTOMATED: CPT

## 2021-09-15 PROCEDURE — 86235 NUCLEAR ANTIGEN ANTIBODY: CPT | Mod: 91

## 2021-09-15 PROCEDURE — 82728 ASSAY OF FERRITIN: CPT

## 2021-09-15 PROCEDURE — 83880 ASSAY OF NATRIURETIC PEPTIDE: CPT

## 2021-09-15 PROCEDURE — 99221 1ST HOSP IP/OBS SF/LOW 40: CPT | Mod: AI,GC | Performed by: HOSPITALIST

## 2021-09-15 PROCEDURE — 770020 HCHG ROOM/CARE - TELE (206)

## 2021-09-15 PROCEDURE — 96367 TX/PROPH/DG ADDL SEQ IV INF: CPT

## 2021-09-15 PROCEDURE — 700111 HCHG RX REV CODE 636 W/ 250 OVERRIDE (IP): Performed by: STUDENT IN AN ORGANIZED HEALTH CARE EDUCATION/TRAINING PROGRAM

## 2021-09-15 PROCEDURE — 85379 FIBRIN DEGRADATION QUANT: CPT

## 2021-09-15 PROCEDURE — 84484 ASSAY OF TROPONIN QUANT: CPT

## 2021-09-15 PROCEDURE — 85025 COMPLETE CBC W/AUTO DIFF WBC: CPT

## 2021-09-15 PROCEDURE — 700111 HCHG RX REV CODE 636 W/ 250 OVERRIDE (IP): Performed by: EMERGENCY MEDICINE

## 2021-09-15 PROCEDURE — 87449 NOS EACH ORGANISM AG IA: CPT

## 2021-09-15 PROCEDURE — 93005 ELECTROCARDIOGRAM TRACING: CPT

## 2021-09-15 PROCEDURE — 96365 THER/PROPH/DIAG IV INF INIT: CPT

## 2021-09-15 PROCEDURE — 93005 ELECTROCARDIOGRAM TRACING: CPT | Performed by: EMERGENCY MEDICINE

## 2021-09-15 PROCEDURE — 80053 COMPREHEN METABOLIC PANEL: CPT

## 2021-09-15 PROCEDURE — 87899 AGENT NOS ASSAY W/OPTIC: CPT

## 2021-09-15 PROCEDURE — 99285 EMERGENCY DEPT VISIT HI MDM: CPT

## 2021-09-15 PROCEDURE — 71275 CT ANGIOGRAPHY CHEST: CPT | Mod: ME

## 2021-09-15 PROCEDURE — 86638 Q FEVER ANTIBODY: CPT

## 2021-09-15 PROCEDURE — 86622 BRUCELLA ANTIBODY: CPT

## 2021-09-15 PROCEDURE — A9270 NON-COVERED ITEM OR SERVICE: HCPCS | Performed by: STUDENT IN AN ORGANIZED HEALTH CARE EDUCATION/TRAINING PROGRAM

## 2021-09-15 PROCEDURE — 700117 HCHG RX CONTRAST REV CODE 255: Performed by: EMERGENCY MEDICINE

## 2021-09-15 PROCEDURE — 86480 TB TEST CELL IMMUN MEASURE: CPT

## 2021-09-15 RX ORDER — POTASSIUM CHLORIDE 20 MEQ/1
40 TABLET, EXTENDED RELEASE ORAL ONCE
Status: COMPLETED | OUTPATIENT
Start: 2021-09-15 | End: 2021-09-15

## 2021-09-15 RX ORDER — ONDANSETRON 2 MG/ML
4 INJECTION INTRAMUSCULAR; INTRAVENOUS EVERY 4 HOURS PRN
Status: DISCONTINUED | OUTPATIENT
Start: 2021-09-15 | End: 2021-09-17 | Stop reason: HOSPADM

## 2021-09-15 RX ORDER — BISACODYL 10 MG
10 SUPPOSITORY, RECTAL RECTAL
Status: DISCONTINUED | OUTPATIENT
Start: 2021-09-15 | End: 2021-09-17 | Stop reason: HOSPADM

## 2021-09-15 RX ORDER — PROMETHAZINE HYDROCHLORIDE 25 MG/1
12.5-25 TABLET ORAL EVERY 4 HOURS PRN
Status: DISCONTINUED | OUTPATIENT
Start: 2021-09-15 | End: 2021-09-17 | Stop reason: HOSPADM

## 2021-09-15 RX ORDER — FUROSEMIDE 10 MG/ML
20 INJECTION INTRAMUSCULAR; INTRAVENOUS
Status: DISCONTINUED | OUTPATIENT
Start: 2021-09-15 | End: 2021-09-16

## 2021-09-15 RX ORDER — PROCHLORPERAZINE EDISYLATE 5 MG/ML
5-10 INJECTION INTRAMUSCULAR; INTRAVENOUS EVERY 4 HOURS PRN
Status: DISCONTINUED | OUTPATIENT
Start: 2021-09-15 | End: 2021-09-17 | Stop reason: HOSPADM

## 2021-09-15 RX ORDER — DEXAMETHASONE SODIUM PHOSPHATE 4 MG/ML
10 INJECTION, SOLUTION INTRA-ARTICULAR; INTRALESIONAL; INTRAMUSCULAR; INTRAVENOUS; SOFT TISSUE ONCE
Status: COMPLETED | OUTPATIENT
Start: 2021-09-15 | End: 2021-09-15

## 2021-09-15 RX ORDER — POLYETHYLENE GLYCOL 3350 17 G/17G
1 POWDER, FOR SOLUTION ORAL
Status: DISCONTINUED | OUTPATIENT
Start: 2021-09-15 | End: 2021-09-17 | Stop reason: HOSPADM

## 2021-09-15 RX ORDER — ONDANSETRON 4 MG/1
4 TABLET, ORALLY DISINTEGRATING ORAL EVERY 4 HOURS PRN
Status: DISCONTINUED | OUTPATIENT
Start: 2021-09-15 | End: 2021-09-17 | Stop reason: HOSPADM

## 2021-09-15 RX ORDER — ALBUTEROL SULFATE 90 UG/1
2 AEROSOL, METERED RESPIRATORY (INHALATION) EVERY 4 HOURS PRN
Status: DISCONTINUED | OUTPATIENT
Start: 2021-09-15 | End: 2021-09-17 | Stop reason: HOSPADM

## 2021-09-15 RX ORDER — ENALAPRIL MALEATE 10 MG/1
10-20 TABLET ORAL 2 TIMES DAILY
Status: ON HOLD | COMMUNITY
End: 2021-09-17

## 2021-09-15 RX ORDER — AZITHROMYCIN 500 MG/1
500 INJECTION, POWDER, LYOPHILIZED, FOR SOLUTION INTRAVENOUS ONCE
Status: COMPLETED | OUTPATIENT
Start: 2021-09-15 | End: 2021-09-15

## 2021-09-15 RX ORDER — SODIUM CHLORIDE, SODIUM LACTATE, POTASSIUM CHLORIDE, AND CALCIUM CHLORIDE .6; .31; .03; .02 G/100ML; G/100ML; G/100ML; G/100ML
30 INJECTION, SOLUTION INTRAVENOUS ONCE
Status: COMPLETED | OUTPATIENT
Start: 2021-09-15 | End: 2021-09-15

## 2021-09-15 RX ORDER — IPRATROPIUM BROMIDE AND ALBUTEROL SULFATE 2.5; .5 MG/3ML; MG/3ML
3 SOLUTION RESPIRATORY (INHALATION)
Status: DISCONTINUED | OUTPATIENT
Start: 2021-09-15 | End: 2021-09-17 | Stop reason: HOSPADM

## 2021-09-15 RX ORDER — PROMETHAZINE HYDROCHLORIDE 25 MG/1
12.5-25 SUPPOSITORY RECTAL EVERY 4 HOURS PRN
Status: DISCONTINUED | OUTPATIENT
Start: 2021-09-15 | End: 2021-09-17 | Stop reason: HOSPADM

## 2021-09-15 RX ORDER — AMOXICILLIN 250 MG
2 CAPSULE ORAL 2 TIMES DAILY
Status: DISCONTINUED | OUTPATIENT
Start: 2021-09-15 | End: 2021-09-17 | Stop reason: HOSPADM

## 2021-09-15 RX ADMIN — SODIUM CHLORIDE 3 G: 900 INJECTION INTRAVENOUS at 13:23

## 2021-09-15 RX ADMIN — POTASSIUM CHLORIDE 40 MEQ: 1500 TABLET, EXTENDED RELEASE ORAL at 16:47

## 2021-09-15 RX ADMIN — SODIUM CHLORIDE, POTASSIUM CHLORIDE, SODIUM LACTATE AND CALCIUM CHLORIDE 2721 ML: 600; 310; 30; 20 INJECTION, SOLUTION INTRAVENOUS at 11:00

## 2021-09-15 RX ADMIN — FUROSEMIDE 20 MG: 10 INJECTION, SOLUTION INTRAMUSCULAR; INTRAVENOUS at 16:47

## 2021-09-15 RX ADMIN — IOHEXOL 50 ML: 350 INJECTION, SOLUTION INTRAVENOUS at 11:50

## 2021-09-15 RX ADMIN — AZITHROMYCIN DIHYDRATE 500 MG: 500 INJECTION, POWDER, LYOPHILIZED, FOR SOLUTION INTRAVENOUS at 12:13

## 2021-09-15 RX ADMIN — DEXAMETHASONE SODIUM PHOSPHATE 10 MG: 4 INJECTION, SOLUTION INTRA-ARTICULAR; INTRALESIONAL; INTRAMUSCULAR; INTRAVENOUS; SOFT TISSUE at 11:59

## 2021-09-15 RX ADMIN — POTASSIUM CHLORIDE 40 MEQ: 1500 TABLET, EXTENDED RELEASE ORAL at 21:09

## 2021-09-15 ASSESSMENT — LIFESTYLE VARIABLES
HAVE PEOPLE ANNOYED YOU BY CRITICIZING YOUR DRINKING: NO
TOTAL SCORE: 0
ON A TYPICAL DAY WHEN YOU DRINK ALCOHOL HOW MANY DRINKS DO YOU HAVE: 0
HOW MANY TIMES IN THE PAST YEAR HAVE YOU HAD 5 OR MORE DRINKS IN A DAY: 0
EVER HAD A DRINK FIRST THING IN THE MORNING TO STEADY YOUR NERVES TO GET RID OF A HANGOVER: NO
CONSUMPTION TOTAL: NEGATIVE
EVER FELT BAD OR GUILTY ABOUT YOUR DRINKING: NO
TOTAL SCORE: 0
DOES PATIENT WANT TO STOP DRINKING: NO
TOTAL SCORE: 0
ALCOHOL_USE: NO
AVERAGE NUMBER OF DAYS PER WEEK YOU HAVE A DRINK CONTAINING ALCOHOL: 0
HAVE YOU EVER FELT YOU SHOULD CUT DOWN ON YOUR DRINKING: NO

## 2021-09-15 ASSESSMENT — COGNITIVE AND FUNCTIONAL STATUS - GENERAL
SUGGESTED CMS G CODE MODIFIER DAILY ACTIVITY: CH
MOBILITY SCORE: 24
SUGGESTED CMS G CODE MODIFIER MOBILITY: CH
DAILY ACTIVITIY SCORE: 24

## 2021-09-15 ASSESSMENT — FIBROSIS 4 INDEX
FIB4 SCORE: 1.83
FIB4 SCORE: 1.83

## 2021-09-15 ASSESSMENT — PATIENT HEALTH QUESTIONNAIRE - PHQ9
2. FEELING DOWN, DEPRESSED, IRRITABLE, OR HOPELESS: NOT AT ALL
SUM OF ALL RESPONSES TO PHQ9 QUESTIONS 1 AND 2: 0
1. LITTLE INTEREST OR PLEASURE IN DOING THINGS: NOT AT ALL

## 2021-09-15 ASSESSMENT — PAIN DESCRIPTION - PAIN TYPE: TYPE: ACUTE PAIN

## 2021-09-15 NOTE — ED TRIAGE NOTES
"Chief Complaint   Patient presents with   • Shortness of Breath     RA saturation 72%, 3L NC 93%. Hx MI. Denies CP. SOB x 4 days. Second Covid vaccine on 9/11/21.     SOB x 4 days post 2nd covid vaccine. RA sat in triage 72%, RR 20.     GCS 15, a/o x 4.     /86   Pulse (!) 117   Temp 36.1 °C (96.9 °F) (Temporal)   Resp 20   Ht 1.753 m (5' 9\")   SpO2 93% Comment: 3L  BMI 32.75 kg/m²   "

## 2021-09-15 NOTE — ED PROVIDER NOTES
ED Provider Note    Scribed for Heidi Manzano M.D. by Omar Woodard. 9/15/2021, 10:29 AM.    Primary care provider: Gretchen Callejas P.A.-C.  Means of arrival: walk in  History obtained from: patient,  (ID: [id number])  History limited by: patient    CHIEF COMPLAINT  Chief Complaint   Patient presents with   • Shortness of Breath     RA saturation 72%, 3L NC 93%. Hx MI. Denies CP. SOB x 4 days. Second Covid vaccine on 9/11/21.       HPI  Lamont Roach is a 51 y.o. male who presents to the Emergency Department for shortness of breath onset 3 days prior to arrival. He notes associated fever. The patient received his 2nd COVID vaccine on 9/11/21 and states he has had his symptoms since then. Per triage note, his oxygen saturation in triage was 72%. He does not normally need at home oxygen. He denies any history of prior lung issues but notes he had an MI 10 years ago. He is not on anticoagualants. He denies any leg swelling, cough, vomiting, diarrhea, back pain, or chest pain. He voices that he only wants to get his oxygen checked and does not want to be admitted because he is the only person in his family who can drive and needs to take care of them.    A qualified  was used to interpret during this encounter.  ’s name/ID number was and mode of interpretation was [id number] . The content of the interpretation included the history above.    REVIEW OF SYSTEMS  Pertinent positives include shortness of breath, fever. Pertinent negatives include no leg swelling, cough, vomiting, diarrhea, back pain, chest pain. All other systems reviewed and negative.     PAST MEDICAL HISTORY   has a past medical history of Hypertension and MI (myocardial infarction) (HCC).    SURGICAL HISTORY   has a past surgical history that includes appendectomy.    SOCIAL HISTORY  Social History     Tobacco Use   • Smoking status: Former Smoker   • Smokeless tobacco: Never Used   Vaping Use   • Vaping Use: Never  "used   Substance Use Topics   • Alcohol use: Never   • Drug use: Never      Social History     Substance and Sexual Activity   Drug Use Never       FAMILY HISTORY  History reviewed. No pertinent family history.    CURRENT MEDICATIONS  Home Medications     Reviewed by Reji Brewster (Pharmacy Tech) on 09/15/21 at 1132  Med List Status: Complete   Medication Last Dose Status   amLODIPine (NORVASC) 5 MG Tab 9/15/2021 Active   aspirin EC (ECOTRIN) 81 MG Tablet Delayed Response 9/15/2021 Active   enalapril (VASOTEC) 10 MG Tab 9/15/2021 Active                ALLERGIES  No Known Allergies    PHYSICAL EXAM  VITAL SIGNS: /86   Pulse (!) 117   Temp 36.1 °C (96.9 °F) (Temporal)   Resp 20   Ht 1.753 m (5' 9\")   SpO2 93% Comment: 3L  BMI 32.75 kg/m²     Constitutional: Well developed, moderate acute distress, Non-toxic appearance.   HENT: Normocephalic, Atraumatic, Bilateral external ears normal, Nose normal.   Eyes: PERRL, EOMI, Conjunctiva normal.   Neck: Normal range of motion, No tenderness, Supple. No stridor.    Cardiovascular: tachycardic heart rate, Normal rhythm. 3/6 systolic murmurs.  Thorax & Lungs: Coarse breath sounds, moderate respiratory distress. No wheezing, No chest tenderness.  Abdomen: Benign abdominal exam, no tenderness, no distention, no guarding, no rebound.   Skin: Warm, Dry, No erythema, No rash.   Back: No tenderness, No CVA tenderness.   Extremities: Intact distal pulses, No edema, No calf tenderness   Neurologic: Alert & oriented x 3, Normal motor function, Normal sensory function, No focal deficits noted.   Psychiatric: Appropriate                                                     DIAGNOSTIC STUDIES / PROCEDURES\    LABS  Results for orders placed or performed during the hospital encounter of 09/15/21   CBC with Differential   Result Value Ref Range    WBC 7.3 4.8 - 10.8 K/uL    RBC 4.13 (L) 4.70 - 6.10 M/uL    Hemoglobin 12.5 (L) 14.0 - 18.0 g/dL    Hematocrit 35.7 (L) 42.0 - " 52.0 %    MCV 86.4 81.4 - 97.8 fL    MCH 30.3 27.0 - 33.0 pg    MCHC 35.0 33.7 - 35.3 g/dL    RDW 43.4 35.9 - 50.0 fL    Platelet Count 142 (L) 164 - 446 K/uL    MPV 11.8 9.0 - 12.9 fL    Neutrophils-Polys 72.30 (H) 44.00 - 72.00 %    Lymphocytes 15.70 (L) 22.00 - 41.00 %    Monocytes 7.40 0.00 - 13.40 %    Eosinophils 3.70 0.00 - 6.90 %    Basophils 0.60 0.00 - 1.80 %    Immature Granulocytes 0.30 0.00 - 0.90 %    Nucleated RBC 0.00 /100 WBC    Neutrophils (Absolute) 5.26 1.82 - 7.42 K/uL    Lymphs (Absolute) 1.14 1.00 - 4.80 K/uL    Monos (Absolute) 0.54 0.00 - 0.85 K/uL    Eos (Absolute) 0.27 0.00 - 0.51 K/uL    Baso (Absolute) 0.04 0.00 - 0.12 K/uL    Immature Granulocytes (abs) 0.02 0.00 - 0.11 K/uL    NRBC (Absolute) 0.00 K/uL   Comp Metabolic Panel   Result Value Ref Range    Sodium 141 135 - 145 mmol/L    Potassium 3.3 (L) 3.6 - 5.5 mmol/L    Chloride 108 96 - 112 mmol/L    Co2 23 20 - 33 mmol/L    Anion Gap 10.0 7.0 - 16.0    Glucose 99 65 - 99 mg/dL    Bun 26 (H) 8 - 22 mg/dL    Creatinine 1.37 0.50 - 1.40 mg/dL    Calcium 8.4 (L) 8.5 - 10.5 mg/dL    AST(SGOT) 15 12 - 45 U/L    ALT(SGPT) 9 2 - 50 U/L    Alkaline Phosphatase 93 30 - 99 U/L    Total Bilirubin 0.7 0.1 - 1.5 mg/dL    Albumin 3.0 (L) 3.2 - 4.9 g/dL    Total Protein 6.9 6.0 - 8.2 g/dL    Globulin 3.9 (H) 1.9 - 3.5 g/dL    A-G Ratio 0.8 g/dL   COV-2, FLU A/B, AND RSV BY PCR (2-4 HOURS CEPHEID): Collect NP swab in VTM    Specimen: Respirate   Result Value Ref Range    Influenza virus A RNA Negative Negative    Influenza virus B, PCR Negative Negative    RSV, PCR Negative Negative    SARS-CoV-2 by PCR NotDetected     SARS-CoV-2 Source NP Swab    CRP QUANTITIVE (NON-CARDIAC)   Result Value Ref Range    Stat C-Reactive Protein 20.89 (H) 0.00 - 0.75 mg/dL   D-DIMER   Result Value Ref Range    D-Dimer Screen 1.31 (H) 0.00 - 0.50 ug/mL (FEU)   PROCALCITONIN   Result Value Ref Range    Procalcitonin 0.41 (H) <0.25 ng/mL   ESTIMATED GFR   Result Value Ref  Range    GFR If African American >60 >60 mL/min/1.73 m 2    GFR If Non African American 54 (A) >60 mL/min/1.73 m 2   LACTIC ACID   Result Value Ref Range    Lactic Acid 1.5 0.5 - 2.0 mmol/L   proBrain Natriuretic Peptide, NT   Result Value Ref Range    NT-proBNP 520 (H) 0 - 125 pg/mL   TROPONIN   Result Value Ref Range    Troponin T 27 (H) 6 - 19 ng/L   EKG   Result Value Ref Range    Report       [facility] Emergency Dept.    Test Date:  2021-09-15  Pt Name:    MADDI BARRIENTOS           Department: ER  MRN:        [id number]                      Room:  Gender:     Male                         Technician: [id number]  :        [id number]                   Requested By:ER TRIAGE PROTOCOL  Order #:    [id number]                    Reading MD: Heidi Jackson MD    Measurements  Intervals                                Axis  Rate:       103                          P:          44  IN:         156                          QRS:        28  QRSD:       88                           T:          16  QT:         336  QTc:        440    Interpretive Statements  SINUS TACHYCARDIA  Compared to ECG 10/22/2020 17:34:22  Possible ischemia no longer present  Electronically Signed On 9- 12:25:05 PDT by Heidi Jackson MD         All labs reviewed by me.    EKG  12 lead EKG interpreted by me as noted above.    RADIOLOGY  CT-CTA CHEST PULMONARY ARTERY W/ RECONS   Final Result      1.  No evidence for pulmonary embolism.   2.  Extensive ground glass opacities again seen throughout the bilateral lungs suspicious for, 19 pneumonia.   3.  Likely reactive mediastinal and bihilar lymphadenopathy. Recommend follow-up imaging after resolution of symptoms to document resolution.   4.  Nodularity along the lingular pleura may represent scarring measuring up to 1.6 cm.   5.  Mild cardiomegaly.   Low and High Risk: Consider CT, PET/CT, or tissue sampling at 3 months      Low Risk - Minimal or absent history of smoking and  of other known risk factors.      High Risk - History of smoking or of other known risk factors.      Note: These recommendations do not apply to lung cancer screening, patients with immunosuppression, or patients with known primary cancer.      Fleischner Society 2017 Guidelines for Management of Incidentally Detected Pulmonary Nodules in Adults      DX-CHEST-PORTABLE (1 VIEW)   Final Result      1.  Diffuse bilateral airspace opacities concerning for Covid pneumonia. This could also represent scarring from patient's prior Covid Pneumonia.        The radiologist's interpretation of all radiological studies have been reviewed by me.    COURSE & MEDICAL DECISION MAKING  Nursing notes, VS, PMSFHx reviewed in chart.   Hospital chart was reviewed.  It appears the patient had a similar episode of this in December 2020.  Although his Covid tests were negative they decided he likely had Covid and was treated in the hospital for this.      Patient just received his second vaccination at about the same time he started having shortness of breath symptoms.  Patient presents with shortness of breath and fever for the last 3 days.  He has a low O2 sat here in the hospital.  Due to the high volume of patients here laboratory studies were ordered in triage and actually had returned while I was seeing the patient.  These labs revealed that he was Covid negative.  Therefore I am concerned about the etiology of the patient's significant hypoxia and shortness of breath.  He has a normal white count without evidence of bandemia.  He has a slightly elevated BUN with normal creatinine.  No significant metabolic acidosis.  D-dimer was elevated as well as an elevated CRP and procalcitonin.  Therefore at this point I do feel a CT of the chest is indicated to further evaluate his shortness of breath to rule out PE as the etiology.  We will also activate sepsis protocol.  Patient has not been hypotensive but he is tachycardic and I do think  some fluids may help although I do not want to be too aggressive with hydration as there is still a chance that he is Covid especially in light of his chest x-ray findings.  We will also give dexamethasone.      10:29 AM Patient seen and examined at bedside. The patient presents with shortness of breath and fever three days prior to arrival and an oxygen saturation in triage of 72%. The differential diagnosis includes but is not limited to pneumonia, sepsis, cardiac etiology, pulmonary embolism. Ordered for CT-CTA chest pulmonary artery w/ recons, DX chest, lactic acid, BNP, troponin, blood culture x2, CRP quantitive, D-dimer, procalcitonin, eGFR, COV-2 flu a/b and RSV, CBC w/, CMP, EKG to evaluate. Patient was treated with Zithromax 500 mg, Unasyn 3 g in  mL, LR infusion bolus, Decadron 10 mg for his symptoms.    HYDRATION: Based on the patient's presentation of Sepsis the patient was given IV fluids. IV Hydration was used because oral hydration was not adequate alone. Patient was given 1 L but it was stopped because he isn't hypotensive and there are still concerns he may have COVID-19 in light of a negative test. As such, I don't want to risk overloading him on fluids.    12:15 PM - Patient seen at bedside. Discussed lab and imaging results with the patient and reinformed him of the need for hospitalization. After deliberation, the patient agreed with the plan to stay. I answered all questions regarding their care. Patient verbalizes understanding and agreement to this plan of care.     CT returned and shows diffuse bilateral airspace opacities consistent with Covid.  Patient had no evidence of PE.  Troponin was only minimally bumped at 27.  BNP was only 520.  Lactic acid was 1.5.  At this point is a confusing picture.  It seems he had a very similar episode a year ago.  I do hear a new murmur on exam.  He does not have a history of a heart murmur.  Therefore I cannot exclude a cardiac etiology for some of  his symptoms.  I do feel he needs hospitalization for further evaluation as I do not feel comfortable calling this Covid with a negative Covid test.    12:54 PM Spoke with [facility] about the patient's condition. They agree to evaluate the patient for hospitalization.    The patient remains critically ill.  Critical care time = 45 minutes in directly providing and coordinating critical care and extensive data review.  No time overlap and excludes procedures.    DISPOSITION:  Patient will be hospitalized by [facility] in guarded condition.     FINAL IMPRESSION  1. Hypoxia    2. SOB (shortness of breath)    3. Acute respiratory distress    4. SIRS (systemic inflammatory response syndrome) (HCC)    5. Newly recognized heart murmur       critical care time = 45 minutes     Omar BRANDT (Scribgunnar), am scribing for, and in the presence of, Heidi Manzano M.D..    Electronically signed by: Omar Woodard (Clarence), 9/15/2021    IHeidi M.D. personally performed the services described in this documentation, as scribed by Omar Woodard in my presence, and it is both accurate and complete. C    The note accurately reflects work and decisions made by me.  Heidi Manzano M.D.  9/15/2021  4:31 PM

## 2021-09-16 ENCOUNTER — APPOINTMENT (OUTPATIENT)
Dept: RADIOLOGY | Facility: MEDICAL CENTER | Age: 51
DRG: 196 | End: 2021-09-16
Attending: STUDENT IN AN ORGANIZED HEALTH CARE EDUCATION/TRAINING PROGRAM
Payer: MEDICARE

## 2021-09-16 PROBLEM — N05.9 GLOMERULONEPHRITIS: Status: ACTIVE | Noted: 2021-09-16

## 2021-09-16 PROBLEM — J84.9 CHRONIC INTERSTITIAL LUNG DISEASE (HCC): Status: ACTIVE | Noted: 2021-09-16

## 2021-09-16 LAB
ALBUMIN SERPL BCP-MCNC: 3.3 G/DL (ref 3.2–4.9)
ALBUMIN/GLOB SERPL: 0.9 G/DL
ALP SERPL-CCNC: 92 U/L (ref 30–99)
ALT SERPL-CCNC: 6 U/L (ref 2–50)
ANION GAP SERPL CALC-SCNC: 13 MMOL/L (ref 7–16)
APPEARANCE UR: CLEAR
AST SERPL-CCNC: 7 U/L (ref 12–45)
BACTERIA #/AREA URNS HPF: NEGATIVE /HPF
BASOPHILS # BLD AUTO: 0.1 % (ref 0–1.8)
BASOPHILS # BLD: 0.01 K/UL (ref 0–0.12)
BILIRUB SERPL-MCNC: 0.5 MG/DL (ref 0.1–1.5)
BILIRUB UR QL STRIP.AUTO: NEGATIVE
BUN SERPL-MCNC: 34 MG/DL (ref 8–22)
CALCIUM SERPL-MCNC: 8.8 MG/DL (ref 8.5–10.5)
CHLORIDE SERPL-SCNC: 105 MMOL/L (ref 96–112)
CHLORIDE UR-SCNC: 46 MMOL/L
CO2 SERPL-SCNC: 21 MMOL/L (ref 20–33)
COLOR UR: YELLOW
CREAT SERPL-MCNC: 1.55 MG/DL (ref 0.5–1.4)
CREAT UR-MCNC: 84.96 MG/DL
EOSINOPHIL # BLD AUTO: 0 K/UL (ref 0–0.51)
EOSINOPHIL NFR BLD: 0 % (ref 0–6.9)
EPI CELLS #/AREA URNS HPF: NEGATIVE /HPF
ERYTHROCYTE [DISTWIDTH] IN BLOOD BY AUTOMATED COUNT: 42.5 FL (ref 35.9–50)
GAMMA INTERFERON BACKGROUND BLD IA-ACNC: 0.02 IU/ML
GLOBULIN SER CALC-MCNC: 3.5 G/DL (ref 1.9–3.5)
GLUCOSE SERPL-MCNC: 159 MG/DL (ref 65–99)
GLUCOSE UR STRIP.AUTO-MCNC: NEGATIVE MG/DL
HCT VFR BLD AUTO: 34.5 % (ref 42–52)
HGB BLD-MCNC: 11.8 G/DL (ref 14–18)
HYALINE CASTS #/AREA URNS LPF: ABNORMAL /LPF
IMM GRANULOCYTES # BLD AUTO: 0.03 K/UL (ref 0–0.11)
IMM GRANULOCYTES NFR BLD AUTO: 0.4 % (ref 0–0.9)
KETONES UR STRIP.AUTO-MCNC: NEGATIVE MG/DL
LEUKOCYTE ESTERASE UR QL STRIP.AUTO: NEGATIVE
LYMPHOCYTES # BLD AUTO: 0.64 K/UL (ref 1–4.8)
LYMPHOCYTES NFR BLD: 7.5 % (ref 22–41)
M TB IFN-G BLD-IMP: NEGATIVE
M TB IFN-G CD4+ BCKGRND COR BLD-ACNC: 0.28 IU/ML
MAGNESIUM SERPL-MCNC: 1.9 MG/DL (ref 1.5–2.5)
MCH RBC QN AUTO: 29.6 PG (ref 27–33)
MCHC RBC AUTO-ENTMCNC: 34.2 G/DL (ref 33.7–35.3)
MCV RBC AUTO: 86.7 FL (ref 81.4–97.8)
MICRO URNS: ABNORMAL
MITOGEN IGNF BCKGRD COR BLD-ACNC: 6.58 IU/ML
MONOCYTES # BLD AUTO: 0.15 K/UL (ref 0–0.85)
MONOCYTES NFR BLD AUTO: 1.8 % (ref 0–13.4)
NEUTROPHILS # BLD AUTO: 7.72 K/UL (ref 1.82–7.42)
NEUTROPHILS NFR BLD: 90.2 % (ref 44–72)
NITRITE UR QL STRIP.AUTO: NEGATIVE
NRBC # BLD AUTO: 0 K/UL
NRBC BLD-RTO: 0 /100 WBC
PH UR STRIP.AUTO: 5.5 [PH] (ref 5–8)
PLATELET # BLD AUTO: 153 K/UL (ref 164–446)
PMV BLD AUTO: 11.7 FL (ref 9–12.9)
POTASSIUM SERPL-SCNC: 4 MMOL/L (ref 3.6–5.5)
POTASSIUM UR-SCNC: 25 MMOL/L
PROCALCITONIN SERPL-MCNC: 0.25 NG/ML
PROT SERPL-MCNC: 6.8 G/DL (ref 6–8.2)
PROT UR QL STRIP: 100 MG/DL
PROT UR-MCNC: 94 MG/DL (ref 0–15)
QFT TB2 - NIL TBQ2: 0.28 IU/ML
RBC # BLD AUTO: 3.98 M/UL (ref 4.7–6.1)
RBC # URNS HPF: ABNORMAL /HPF
RBC UR QL AUTO: NEGATIVE
SODIUM SERPL-SCNC: 139 MMOL/L (ref 135–145)
SODIUM UR-SCNC: 56 MMOL/L
SP GR UR STRIP.AUTO: 1.01
UROBILINOGEN UR STRIP.AUTO-MCNC: 1 MG/DL
WBC # BLD AUTO: 8.6 K/UL (ref 4.8–10.8)
WBC #/AREA URNS HPF: ABNORMAL /HPF

## 2021-09-16 PROCEDURE — 770020 HCHG ROOM/CARE - TELE (206)

## 2021-09-16 PROCEDURE — 84133 ASSAY OF URINE POTASSIUM: CPT

## 2021-09-16 PROCEDURE — 700102 HCHG RX REV CODE 250 W/ 637 OVERRIDE(OP): Performed by: STUDENT IN AN ORGANIZED HEALTH CARE EDUCATION/TRAINING PROGRAM

## 2021-09-16 PROCEDURE — 700102 HCHG RX REV CODE 250 W/ 637 OVERRIDE(OP): Performed by: HOSPITALIST

## 2021-09-16 PROCEDURE — 80053 COMPREHEN METABOLIC PANEL: CPT

## 2021-09-16 PROCEDURE — A9270 NON-COVERED ITEM OR SERVICE: HCPCS | Performed by: HOSPITALIST

## 2021-09-16 PROCEDURE — 76775 US EXAM ABDO BACK WALL LIM: CPT

## 2021-09-16 PROCEDURE — 84300 ASSAY OF URINE SODIUM: CPT

## 2021-09-16 PROCEDURE — 84145 PROCALCITONIN (PCT): CPT

## 2021-09-16 PROCEDURE — 82570 ASSAY OF URINE CREATININE: CPT

## 2021-09-16 PROCEDURE — 85025 COMPLETE CBC W/AUTO DIFF WBC: CPT

## 2021-09-16 PROCEDURE — 99223 1ST HOSP IP/OBS HIGH 75: CPT | Mod: GC | Performed by: INTERNAL MEDICINE

## 2021-09-16 PROCEDURE — 36415 COLL VENOUS BLD VENIPUNCTURE: CPT

## 2021-09-16 PROCEDURE — A9270 NON-COVERED ITEM OR SERVICE: HCPCS | Performed by: STUDENT IN AN ORGANIZED HEALTH CARE EDUCATION/TRAINING PROGRAM

## 2021-09-16 PROCEDURE — 99232 SBSQ HOSP IP/OBS MODERATE 35: CPT | Mod: GC | Performed by: HOSPITALIST

## 2021-09-16 PROCEDURE — 84156 ASSAY OF PROTEIN URINE: CPT

## 2021-09-16 PROCEDURE — 700111 HCHG RX REV CODE 636 W/ 250 OVERRIDE (IP): Performed by: STUDENT IN AN ORGANIZED HEALTH CARE EDUCATION/TRAINING PROGRAM

## 2021-09-16 PROCEDURE — 82436 ASSAY OF URINE CHLORIDE: CPT

## 2021-09-16 PROCEDURE — 83735 ASSAY OF MAGNESIUM: CPT

## 2021-09-16 PROCEDURE — 81001 URINALYSIS AUTO W/SCOPE: CPT

## 2021-09-16 RX ORDER — ACETAMINOPHEN 325 MG/1
650 TABLET ORAL EVERY 6 HOURS PRN
Status: DISCONTINUED | OUTPATIENT
Start: 2021-09-16 | End: 2021-09-17 | Stop reason: HOSPADM

## 2021-09-16 RX ORDER — ASPIRIN 81 MG/1
81 TABLET, CHEWABLE ORAL DAILY
Status: DISCONTINUED | OUTPATIENT
Start: 2021-09-16 | End: 2021-09-17 | Stop reason: HOSPADM

## 2021-09-16 RX ORDER — AMLODIPINE BESYLATE 5 MG/1
5 TABLET ORAL
Status: DISCONTINUED | OUTPATIENT
Start: 2021-09-16 | End: 2021-09-17 | Stop reason: HOSPADM

## 2021-09-16 RX ORDER — ENALAPRIL MALEATE 10 MG/1
10 TABLET ORAL
Status: DISCONTINUED | OUTPATIENT
Start: 2021-09-16 | End: 2021-09-16

## 2021-09-16 RX ORDER — ENALAPRIL MALEATE 10 MG/1
20 TABLET ORAL EVERY EVENING
Status: DISCONTINUED | OUTPATIENT
Start: 2021-09-16 | End: 2021-09-16

## 2021-09-16 RX ORDER — ENALAPRIL MALEATE 10 MG/1
10 TABLET ORAL EVERY MORNING
Status: DISCONTINUED | OUTPATIENT
Start: 2021-09-16 | End: 2021-09-16

## 2021-09-16 RX ORDER — LISINOPRIL 5 MG/1
5 TABLET ORAL
Status: DISCONTINUED | OUTPATIENT
Start: 2021-09-17 | End: 2021-09-17 | Stop reason: HOSPADM

## 2021-09-16 RX ADMIN — AMLODIPINE BESYLATE 5 MG: 5 TABLET ORAL at 06:11

## 2021-09-16 RX ADMIN — FUROSEMIDE 20 MG: 10 INJECTION, SOLUTION INTRAMUSCULAR; INTRAVENOUS at 06:12

## 2021-09-16 RX ADMIN — ACETAMINOPHEN 650 MG: 325 TABLET, FILM COATED ORAL at 19:39

## 2021-09-16 RX ADMIN — ENOXAPARIN SODIUM 40 MG: 40 INJECTION SUBCUTANEOUS at 06:12

## 2021-09-16 RX ADMIN — ENALAPRIL MALEATE 10 MG: 10 TABLET ORAL at 06:11

## 2021-09-16 RX ADMIN — ASPIRIN 81 MG: 81 TABLET, CHEWABLE ORAL at 06:11

## 2021-09-16 RX ADMIN — DOCUSATE SODIUM 50 MG AND SENNOSIDES 8.6 MG 2 TABLET: 8.6; 5 TABLET, FILM COATED ORAL at 06:11

## 2021-09-16 ASSESSMENT — ENCOUNTER SYMPTOMS
CONSTIPATION: 0
MYALGIAS: 0
HEADACHES: 0
FEVER: 0
FEVER: 1
ABDOMINAL PAIN: 0
VOMITING: 0
PALPITATIONS: 0
EYE DISCHARGE: 0
NAUSEA: 0
SPUTUM PRODUCTION: 0
EYE PAIN: 0
ORTHOPNEA: 0
BLOOD IN STOOL: 0
SHORTNESS OF BREATH: 0
CONSTIPATION: 1
BRUISES/BLEEDS EASILY: 0
DOUBLE VISION: 0
BLURRED VISION: 0
COUGH: 0
SHORTNESS OF BREATH: 1
CHILLS: 0
CHILLS: 1

## 2021-09-16 ASSESSMENT — PAIN DESCRIPTION - PAIN TYPE
TYPE: ACUTE PAIN
TYPE: ACUTE PAIN

## 2021-09-16 ASSESSMENT — FIBROSIS 4 INDEX: FIB4 SCORE: 0.97

## 2021-09-16 NOTE — ASSESSMENT & PLAN NOTE
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   Tubular basement membrane immune deposits were also detected by immunofluorescence microscopy.  Evidence of acute tubular injury found.  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  ##Wegener's granulomatosis as it has both lung and renal findings  ##Microscopic polyangiitis  -Nephrology has been consulted for further work-up and possible initiation of steroids/immunosuppressive therapy.

## 2021-09-16 NOTE — DISCHARGE PLANNING
Anticipated Discharge Disposition: Home with O2    Action: LSW assisted [facility] with DME choice. LSWspoke to patient at bedside. Patient gave choice for [facility]. LSW explained to patient to call [facility] @ [place] for concentrator. LSW also presented IMM to patient.    Barriers to Discharge: O2 acceptance and delivery    Plan: RN CM to follow up on O2

## 2021-09-16 NOTE — ASSESSMENT & PLAN NOTE
##Chronic interstitial lung disease VS bacterial pneumonia VS Covid infection VS reaction to Covid vaccine    Patient presented with shortness of breath, subjective fever and productive cough for 4 days.  He received his second dose of Covid vaccine on the 9/11 and associates his symptoms to the time he received the vaccine.    -His Covid test came out negative.  -His symptoms could also be a reaction to the Covid vaccine, however, CT was performed that showed groundglass opacities in the bilateral lung fields unchanged from the previous CT in 10/2020 which was performed when he presented with similar complaints, and was found to be Covid negative.    -Patient has a history of working in construction, it could be interstitial lung disease as well.  Probably silica-exposure to silica is, and construction workers, silica containing dust even at low exposure levels can cause silicosis.  Silicosis is progressive even after the exposure has ceased.  -Pulmonary function tests if performed would probably show a restrictive pattern.    -He has a history of travel to [place] where he stayed for some months.  However, he denies any sick contacts, hemoptysis, no known TB contact and no recent weight loss.  QuantiFERON was ordered, pending results.  -This could also be the presentation of atypical pneumonia, however, unlikely. (Mycoplasma pneumoniae, Legionella)  -Blood cultures were ordered, negative to date.    -His presentation could also be due to an autoimmune etiology, like sarcoidosis, Wegener's granulomatosis.  As chart review shows a renal biopsy performed 10/2020 showing immune complex mediated glomerulonephritis.  Nephrology has been consulted for further work-up and possible initiation of steroids/immunosuppressive therapy.    -Oxygen walk test/oxygen evaluation for possible need of oxygen to be discharged home.

## 2021-09-16 NOTE — CONSULTS
Pulmonary Consultation    Date of Service: 9/16/2021    Date of Admission:  9/15/2021 10:21 AM    Consulting Provider:  Maria Teresa Draper M.D.     Chief Complaint:  Shortness of Breath (RA saturation 72%, 3L NC 93%. Hx MI. Denies CP. SOB x 4 days. Second Covid vaccine on 9/11/21.)      History of Present Illness/Hospital Course: Lamont Roach is a 51 y.o. 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    Patient reports that his shortness of breath developed after receiving his second dose of his covid vaccine 4 days before admission. Patient says that he developed fever/chills, shortness of breath and fatigue over the next few days, which continued to get worse, prompting him to present to the ED for further evaluation. Patient says that he was admitted last year with similar complaints where he was diagnosed and treated for presumed COVID-19 pneumonia with overlying bacterial pneumonia even though his COVID tests continued to be negative during his stay. He otherwise denies any other similar events in the past.     Patient denied any history of auto-immune or skin diseases in his family. He denies any family history of heart attacks. 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 Patient says that he was in [place] from October of last year after discharge from the hospital to June of this year. Denies any previous history of tuberculosis or known exposure to tuberculosis. Patient currently lives with wife and daughter in [place].    No other complaints at this time.    Review of Systems   Constitutional: Positive for chills, fever and malaise/fatigue.   Eyes: Negative for blurred vision and double vision.   Respiratory: Positive for shortness of breath. Negative for cough and sputum production.    Cardiovascular:  "Negative for chest pain and palpitations.   Gastrointestinal: Positive for constipation. Negative for blood in stool, melena, nausea and vomiting.   Genitourinary: Negative for dysuria and hematuria.   Musculoskeletal: Negative for joint pain and myalgias.   Skin: Negative for itching and rash.       Home Medications     Reviewed by Reji Brewster (Pharmacy Tech) on 09/15/21 at 1132  Med List Status: Complete   Medication Last Dose Status   amLODIPine (NORVASC) 5 MG Tab 9/15/2021 Active   aspirin EC (ECOTRIN) 81 MG Tablet Delayed Response 9/15/2021 Active   enalapril (VASOTEC) 10 MG Tab 9/15/2021 Active                Social History     Tobacco Use   • Smoking status: Former Smoker   • Smokeless tobacco: Never Used   Vaping Use   • Vaping Use: Never used   Substance Use Topics   • Alcohol use: Never   • Drug use: Never        Past Medical History:   Diagnosis Date   • Hypertension    • MI (myocardial infarction) (HCC)        Past Surgical History:   Procedure Laterality Date   • APPENDECTOMY         Allergies: Patient has no known allergies.    History reviewed. No pertinent family history.    Pulmonary-Specific Vital Signs  Vitals  Blood Pressure: 130/93  Temperature: 36.7 °C (98.1 °F)  Temp src: Temporal  Pulse: 98  Respiration: 18  Pulse Oximetry: 94 %  Height: 175.3 cm (5' 9\")  Weight: 92.1 kg (203 lb 0.7 oz)    Physical Examination  Physical Exam  Constitutional:       General: He is not in acute distress.     Appearance: Normal appearance.   HENT:      Head: Normocephalic and atraumatic.      Mouth/Throat:      Mouth: Mucous membranes are moist.      Pharynx: Oropharynx is clear. No oropharyngeal exudate or posterior oropharyngeal erythema.   Eyes:      General: No scleral icterus.        Right eye: No discharge.         Left eye: No discharge.      Conjunctiva/sclera: Conjunctivae normal.   Cardiovascular:      Rate and Rhythm: Normal rate.      Pulses: Normal pulses.      Heart sounds: Murmur " (Holosystolic murmur) heard.   No gallop.    Pulmonary:      Effort: Pulmonary effort is normal. No respiratory distress.      Breath sounds: No stridor. Rhonchi present. No wheezing.   Abdominal:      General: Abdomen is flat. There is no distension.      Palpations: There is no mass.      Tenderness: There is no abdominal tenderness.      Hernia: No hernia is present.   Musculoskeletal:         General: No swelling or tenderness.      Right lower leg: No edema.      Left lower leg: No edema.   Skin:     Findings: Bruising (Over b/l lower extremities.) present. No erythema or rash.   Neurological:      Mental Status: He is alert and oriented to person, place, and time.   Psychiatric:         Mood and Affect: Mood normal.         Behavior: Behavior normal.             Intake/Output Summary (Last 24 hours) at 9/16/2021 1456  Last data filed at 9/16/2021 1216  Gross per 24 hour   Intake 240 ml   Output 1300 ml   Net -1060 ml       Recent Labs     09/15/21  0758 09/16/21  0039   WBC 7.3 8.6   NEUTSPOLYS 72.30* 90.20*   LYMPHOCYTES 15.70* 7.50*   MONOCYTES 7.40 1.80   EOSINOPHILS 3.70 0.00   BASOPHILS 0.60 0.10   ASTSGOT 15 7*   ALTSGPT 9 6   ALKPHOSPHAT 93 92   TBILIRUBIN 0.7 0.5     Recent Labs     09/15/21  0758 09/16/21  0039   SODIUM 141 139   POTASSIUM 3.3* 4.0   CHLORIDE 108 105   CO2 23 21   BUN 26* 34*   CREATININE 1.37 1.55*   MAGNESIUM  --  1.9   CALCIUM 8.4* 8.8     Recent Labs     09/15/21  0758 09/16/21  0039   ALTSGPT 9 6   ASTSGOT 15 7*   ALKPHOSPHAT 93 92   TBILIRUBIN 0.7 0.5   GLUCOSE 99 159*         CT-CTA CHEST PULMONARY ARTERY W/ RECONS   Final Result      1.  No evidence for pulmonary embolism.   2.  Extensive ground glass opacities again seen throughout the bilateral lungs suspicious for, 19 pneumonia.   3.  Likely reactive mediastinal and bihilar lymphadenopathy. Recommend follow-up imaging after resolution of symptoms to document resolution.   4.  Nodularity along the lingular pleura may  represent scarring measuring up to 1.6 cm.   5.  Mild cardiomegaly.   Low and High Risk: Consider CT, PET/CT, or tissue sampling at 3 months      Low Risk - Minimal or absent history of smoking and of other known risk factors.      High Risk - History of smoking or of other known risk factors.      Note: These recommendations do not apply to lung cancer screening, patients with immunosuppression, or patients with known primary cancer.      Fleischner Society 2017 Guidelines for Management of Incidentally Detected Pulmonary Nodules in Adults      DX-CHEST-PORTABLE (1 VIEW)   Final Result      1.  Diffuse bilateral airspace opacities concerning for Covid pneumonia. This could also represent scarring from patient's prior Covid Pneumonia.      US-RENAL    (Results Pending)       Patient Active Problem List   Diagnosis   • Bilateral pneumonia   • H/O: CVA (cerebrovascular accident)   • HTN (hypertension)       Assessment and Recommendations:  Lamont Roach is a 51 y.o. 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  #Interstitial lung disease.  -Patient's repeat CT showing worsening lung damage compared to previous CT in October of last year.  -Elevated ferritin, d-dimer, CRP, ESR and LDH on admission.  -Labs pending for legionella, MPO w/ OH-3 reflex to ANCA, quantiferon gold, and brucella.   -Advised patient to quit smoking due to lung disease.  -Advised primary team to consult nephrology to determine steroid dosing and  follow-up.  -Ordered connective tissue disease panel.  -Will set-up follow in pulmonology clinic as outpatient.    Sultan KARLY Bradley M.D.,    The patient was seen and plan discussed with my attending, Dr. Don.

## 2021-09-16 NOTE — PROGRESS NOTES
Daily Progress Note:     Date of Service: 9/16/2021  Primary Team: [facility] Blue Team   Attending: Maria Teresa Draper M.D.   Senior Resident: Dr. Kita Flores  Intern: Dr. Muna Caicedo  Contact:  [phone]    Chief Complaint:   Shortness of breath    Subjective:  No acute events overnight.  Patient denies any chills, fever, chest pain, any ongoing shortness of breath or any pain or swelling in his legs.  He reports an increased frequency of urination, however, denies any dysuria, burning or urgency with urination.  He attributes the increased frequency of urination to the water pill he got before.  Denies any discomfort.    Consultants/Specialty:  Nephrology.    Review of Systems:   Review of Systems   Constitutional: Negative for chills, fever and malaise/fatigue.   HENT: Negative for congestion, ear discharge and hearing loss.    Eyes: Negative for pain and discharge.   Respiratory: Negative for cough and shortness of breath.    Cardiovascular: Negative for chest pain, palpitations and orthopnea.   Gastrointestinal: Negative for abdominal pain and constipation.   Genitourinary: Positive for frequency. Negative for dysuria and urgency.   Musculoskeletal: Negative for myalgias.   Skin: Negative for itching and rash.   Neurological: Negative for headaches.   Endo/Heme/Allergies: Does not bruise/bleed easily.       Objective Data:   Vitals:   Temp:  [36.3 °C (97.3 °F)-37 °C (98.6 °F)] 36.7 °C (98.1 °F)  Pulse:  [] 98  Resp:  [17-22] 18  BP: (116-133)/(81-93) 130/93  SpO2:  [92 %-96 %] 94 %  Physical Exam:   Physical Exam  Constitutional:       General: He is not in acute distress.     Appearance: Normal appearance. He is not ill-appearing, toxic-appearing or diaphoretic.   HENT:      Head: Normocephalic and atraumatic.      Nose: Nose normal. No congestion.      Mouth/Throat:      Mouth: Mucous membranes are moist.      Pharynx: No oropharyngeal exudate.   Eyes:      General:         Right eye: No discharge.          Left eye: No discharge.      Conjunctiva/sclera: Conjunctivae normal.   Cardiovascular:      Rate and Rhythm: Regular rhythm. Tachycardia present.      Pulses: Normal pulses.      Heart sounds: Normal heart sounds.   Pulmonary:      Effort: Pulmonary effort is normal.      Breath sounds: Normal breath sounds.   Abdominal:      General: Bowel sounds are normal. There is no distension.      Palpations: Abdomen is soft.      Tenderness: There is no abdominal tenderness. There is no right CVA tenderness or left CVA tenderness.   Musculoskeletal:         General: No swelling.      Right lower leg: No edema.      Left lower leg: No edema.   Skin:     General: Skin is warm.      Capillary Refill: Capillary refill takes less than 2 seconds.   Neurological:      Mental Status: He is alert and oriented to person, place, and time.   Psychiatric:         Mood and Affect: Mood normal.         Labs:   Recent Labs     09/15/21  0758 09/16/21 0039   WBC 7.3 8.6   RBC 4.13* 3.98*   HEMOGLOBIN 12.5* 11.8*   HEMATOCRIT 35.7* 34.5*   MCV 86.4 86.7   MCH 30.3 29.6   RDW 43.4 42.5   PLATELETCT 142* 153*   MPV 11.8 11.7   NEUTSPOLYS 72.30* 90.20*   LYMPHOCYTES 15.70* 7.50*   MONOCYTES 7.40 1.80   EOSINOPHILS 3.70 0.00   BASOPHILS 0.60 0.10     Recent Labs     09/15/21  0758 09/16/21 0039   SODIUM 141 139   POTASSIUM 3.3* 4.0   CHLORIDE 108 105   CO2 23 21   GLUCOSE 99 159*   BUN 26* 34*           Imaging:     CHEST XRAY 9/15:  Showed diffuse bilateral airspace opacities.    CT-CTA PULMONART ARTERY W/RECONS 9/15:  Negative for pulmonary embolism.  Showed groundglass opacities in the bilateral lungs unchanged from the previous CT 10/2020.  Mild cardiomegaly.  Some lymphadenopathy (mediastinal, bihilar).    EKG 9/15:  QTC at 440  Sinus tachycardia, rate 103.    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  -Tubular basement membrane  immune deposits detected by immunofluorescence microscopy.  -Acute tubular injury.  -Nephrosclerosis with features of focal global and focal segmental glomerulosclerosis, mild tubular atrophy and fibrosis, mild arteriosclerosis and mild arteriolar hyalinosis.       51-year-old Swazi-speaking male, prior drywall  with history of chest CT notable for groundglass opacities 10/2020, not dependent on supplemental oxygen presented with subjective fever and malaise after getting his second shot of Covid vaccine (9/11), found to be hypoxic with SPO2 69% and persistent groundglass opacities on chest CT which was negative for pulmonary embolism.  9/16 -No ongoing shortness of breath, fever or requirement of oxygen.  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      * Chronic interstitial lung disease (HCC)  Assessment & Plan  ##Chronic interstitial lung disease VS bacterial pneumonia VS Covid infection VS reaction to Covid vaccine    Patient presented with shortness of breath, subjective fever and productive cough for 4 days.  He received his second dose of Covid vaccine on the 9/11 and associates his symptoms to the time he received the vaccine.    -His Covid test came out negative.  -His symptoms could also be a reaction to the Covid vaccine, however, CT was performed that showed groundglass opacities in the bilateral lung fields unchanged from the previous CT in 10/2020 which was performed when he presented with similar complaints, and was found to be Covid negative.    -Patient has a history of working in construction, it could be interstitial lung disease as well.  Probably silica-exposure to silica is, and construction workers, silica containing dust even at low exposure levels can cause silicosis.  Silicosis is progressive even after the exposure has ceased.  -Pulmonary function tests if performed would probably show a  restrictive pattern.    -He has a history of travel to [place] where he stayed for some months.  However, he denies any sick contacts, hemoptysis, no known TB contact and no recent weight loss.  QuantiFERON was ordered, pending results.  -This could also be the presentation of atypical pneumonia, however, unlikely. (Mycoplasma pneumoniae, Legionella)  -Blood cultures were ordered, negative to date.    -His presentation could also be due to an autoimmune etiology, like sarcoidosis, Wegener's granulomatosis.  As chart review shows a renal biopsy performed 10/2020 showing immune complex mediated glomerulonephritis.  Nephrology has been consulted for further work-up and possible initiation of steroids/immunosuppressive therapy.    -Oxygen walk test/oxygen evaluation for possible need of oxygen to be discharged home.        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   Tubular basement membrane immune deposits were also detected by immunofluorescence microscopy.  Evidence of acute tubular injury found.  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  ##Wegener's granulomatosis as it has both lung and renal findings  ##Microscopic polyangiitis  -Nephrology has been consulted for further work-up and possible initiation of steroids/immunosuppressive therapy.    HTN (hypertension)- (present on admission)  Assessment & Plan  Patient has a history of hypertension, however, his blood pressures have been well controlled in the hospital.  On amlodipine 5 mg and lisinopril 5 mg once a day.  -The renal biopsy 10/2020 showed segmental glomerulosclerosis and mild tubular atrophy with fibrosis and mild arteriosclerosis with some arteriolar hyalinosis.   These findings are probably the result of his hypertension.    H/O: CVA (cerebrovascular accident)- (present on admission)  Assessment & Plan  Patient has a history of stroke in 2011 with residual left-sided weakness.  The weakness does not interfere with his activities of daily living, and he is able to take care of himself well.    DVT prophylaxis: enoxaparin

## 2021-09-16 NOTE — ASSESSMENT & PLAN NOTE
-Patient is a history of high blood pressure, blood pressure today of 112/74.  Patient blood pressure on admission 149/86.  -Continue home medication of amlodipine 5 mg daily, and enalapril 10 mg in the a.m., 20 mg in the evening, aspirin 81 mg daily.

## 2021-09-16 NOTE — H&P
History & Physical Note    Date of Admission: 9/15/2021  Admission Status: Inpatient  [facility] Team: [facility]  Attending: Maria Teresa Draper M.D.   Senior Resident: Dr. Alethea Hare MD  Intern: Dr. Jorge Eldridge DO  Contact Number: [phone]    Chief Complaint: Shortness of breath    History of Present Illness (HPI):  Lamont is a 51 y.o. male who presented 9/15/2021 with history of stroke in 2011 with residual left-sided weakness, HTN admitted for new onset shortness of breath, productive cough, subjective fever for 4 days.  Patient notes that he got the Covid vaccine on 9/11.  Following that patient noted that he had a subjective fever, did not take his temperature however noted that his head felt warm, also noted fatigue and a cough that started that day.  Following his initial symptoms, patient noted shortness of breath and difficulty walking around without becoming winded.  Symptoms of shortness of breath continue to worsen until patient decided to come to the emergency department.  Patient notes that any type of ambulation made his shortness of breath worse, resting did improve however still felt significantly fatigued compared to baseline.  Patient notes he lives at home with his wife and daughter, denies any recent illness, denies any recent sick contacts, patient does note that after his hospitalization in October 2020 traveled to [place] and stayed there for 8 months.  Patient also notes that he works as a .    In the ED patient received a single dose of Unasyn, azithromycin, and was placed on 4 L nasal cannula due to an oxygen saturation of 69%.  Patient was oxygenating well on nasal cannula and will be admitted to telemetry for potential Covid infection versus chronic lung inflammation.    Note that patient is Greek-speaking, utilized  services, [employer] #[id number] to communicate with patient and daughter.    Review of Systems:  General: Positive fatigue,  fevers, headache, denies myalgia, chills  HEENT: Denies vision or hearing, sore throat, swelling in neck  CV: Denies palpitations, chest pain, pressure, tightness  Respiratory: Positive shortness of breath, cough denies pain on inspiration  GI: Denies abdominal pain, N/V/D/C , hematochezia  : Denies dysuria, hematuria  Vascular: Denies easy bruising or bleeding  MSK: Positive weakness on left side, denies joint pain, joint swelling  Heme/Endo: Denies polyuria or polydipsia  Neuro: Denies tremors, seizures, focal weakness, numbness or tingling in hands or feet  Psych: Denies anxiety, depression    Past Medical History:   Past Medical History was reviewed with patient.   has a past medical history of Hypertension and MI (myocardial infarction) ([facility]). He also has no past medical history of Diabetes ([facility]).    Past Surgical History: Past Surgical History was reviewed with patient.   has a past surgical history that includes appendectomy.    Medications: Medications have been reviewed with patient.  Prior to Admission Medications   Prescriptions Last Dose Informant Patient Reported? Taking?   amLODIPine (NORVASC) 5 MG Tab 9/15/2021 at 0600 Patient Yes No   Sig: Take 5 mg by mouth every morning.   aspirin EC (ECOTRIN) 81 MG Tablet Delayed Response 9/15/2021 at 0600 Patient Yes No   Sig: Take 81 mg by mouth every morning.   enalapril (VASOTEC) 10 MG Tab 9/15/2021 at 0600  Yes Yes   Sig: Take 10-20 mg by mouth 2 times a day. 10 mg in AM and 20 mg at bedtime      Facility-Administered Medications: None        Allergies: Allergies have been reviewed with patient.  No Known Allergies    Family History:  family history is not on file.     Social History:   Tobacco: 10-pack-year smoker, notes that he smokes about 3 to 4 cigarettes/day  Alcohol: Patient is a drink socially  Recreational drugs (illegal and prescription): Denies recreational drug use  Employment: Patient works as a   Activity Level: Has moderate  to high activity level  Living situation: Patient lives in house with daughter and wife  Recent travel: Patient notes that he was in [place] for about 8 months from October to April.  Primary Care Provider: bang Callejas P.A.-C.  Other (stressors, spirituality, exposures): There are no SDH that will inhibit care today.    Vitals:  Temp:  [36.1 °C (96.9 °F)] 36.1 °C (96.9 °F)  Pulse:  [] 85  Resp:  [12-24] 24  BP: (106-149)/(61-86) 106/61  SpO2:  [69 %-98 %] 94 %    Physical exam:   General: AC/NT, NAD, patient lying in bed  Eyes: EOMI, PERRLA  HENT: Oropharynx is without erythema or exudate, ears and nose normal, no LAD, no thyromegaly  CV: Regular rate and rhythm, no murmurs, no JVD, no carotid bruits  Respiratory: CTA B, symmetric chest rise, no accessory muscle use, no W/R/R  GI: Soft, NT/ND, no peritoneal signs, NABS  Vascular: Pulses 2/4 in upper and lower extremities  MSK: Free active and passive range of motion in upper and lower extremities   Neuro: Alert and oriented x4, cranial nerves II through XII grossly intact, DTRs 2/4 in upper and lower extremities, muscle strength 5/5    Labs:   Lab Results   Component Value Date/Time    WBC 7.3 09/15/2021 07:58 AM    RBC 4.13 (L) 09/15/2021 07:58 AM    HEMOGLOBIN 12.5 (L) 09/15/2021 07:58 AM    HEMATOCRIT 35.7 (L) 09/15/2021 07:58 AM    MCV 86.4 09/15/2021 07:58 AM    MCH 30.3 09/15/2021 07:58 AM    MCHC 35.0 09/15/2021 07:58 AM    MPV 11.8 09/15/2021 07:58 AM    NEUTSPOLYS 72.30 (H) 09/15/2021 07:58 AM    LYMPHOCYTES 15.70 (L) 09/15/2021 07:58 AM    MONOCYTES 7.40 09/15/2021 07:58 AM    EOSINOPHILS 3.70 09/15/2021 07:58 AM    BASOPHILS 0.60 09/15/2021 07:58 AM        Lab Results   Component Value Date/Time    SODIUM 141 09/15/2021 07:58 AM    POTASSIUM 3.3 (L) 09/15/2021 07:58 AM    CHLORIDE 108 09/15/2021 07:58 AM    CO2 23 09/15/2021 07:58 AM    GLUCOSE 99 09/15/2021 07:58 AM    BUN 26 (H) 09/15/2021 07:58 AM    CREATININE 1.37 09/15/2021 07:58 AM      Lab Results   Component Value Date/Time    PROTHROMBTM 14.8 (H) 10/25/2020 12:53 AM    INR 1.13 10/25/2020 12:53 AM      EKG: Per my read, sinus tachycardia without any other significant abnormality    Imaging:   CT angiogram, 09/15/2021-CT notes extensive groundglass opacities seen throughout bilateral lungs, there is also reactive mediastinal and bihilar lymphadenopathy and 1.6 cm nodule in the lingula.    Chest x-ray, 09/15/2021-diffuse bilateral opacities, the image looks very similar to previous image from October.    Previous Data Review: reviewed    Assessment and Plan:   Mr. Yaw Roach is a 51-year-old male with a history of stroke with residual left-sided weakness, hypertension, recent Covid vaccination 4 days ago admitted for recurrent symptoms of shortness of breath similar from previous hospitalizations in 2020.    Bilateral pneumonia- (present on admission)  Assessment & Plan  -Patient notes 4 days of shortness of breath, subjective fever, productive cough.  -, troponin 27, lactate 1.5, procalcitonin 0.41, D-dimer 1.31, CRP 20.89  -EKG indicates sinus tachycardia, CT PE and chest x-ray indicate significant interstitial edema, imaging looks similar to hospitalization in October 2020.  -Patient received COVID-19 vaccine 4 days ago, states that his symptoms started around that time.  However notes that his symptoms are very similar to 2 hospitalizations that occurred last year.  Patient notes that he has had travel outside the country to [place] for 8 months from 10/2020-04/2020.  -Patient was negative for COVID-19, was also tested during previous hospitalizations and was negative.  History and labs follow a infective pattern however autoimmune in chronic conditions cannot be completely ruled out.  -C. jerzy, Legionella, MPO and W[place]-3 with reflex ANCA, strep pneumo, QuantiFERON, Brucella labs also sent.  Given outside travel and working construction, chronic ILD should be a  consideration.  -Patient will be admitted to telemetry for potential Covid infection and further work-up of interstitial edema and hypoxia.  -Can consider pulmonology consult tomorrow for further work up of  potential chronic interstitial lung disease.  -Patient is severely hypoxic on admission at 69% and required supplemental oxygen, in the meantime patient will be treated as a pneumonia.  Received a single dose of Unasyn and azithromycin in the ED, if patient continues to deteriorate or requires more oxygen can continue Unasyn.  -Patient had blood cultures drawn in ED, please follow-up in the morning.  -Can utilize albuterol, duo nebs as needed.  -We will repeat procalcitonin in the morning.    HTN (hypertension)- (present on admission)  Assessment & Plan  -Patient is a history of high blood pressure, blood pressure today of 112/74.  Patient blood pressure on admission 149/86.  -Continue home medication of amlodipine 5 mg daily, and enalapril 10 mg in the a.m., 20 mg in the evening, aspirin 81 mg daily.    H/O: CVA (cerebrovascular accident)- (present on admission)  Assessment & Plan  -Patient had a stroke in 2011.  Patient is unsure of specific date.  -Patient notes residual left-sided weakness.  -Patient is able to ambulate and perform daily activities without issue.  Recent decrease in activity secondary to shortness of breath from primary illness.    Code Status: Full  DVT prophylaxis:  Enoxaparin 40 mg  Diet:  Cardiac diet as tolerated  GI: Bowel regimen in place  Disposition: To home pending improvement in oxygen status and continued work-up of apparent chronic lung disease.    Jorge Eldridge DO, MPH  PGY-1 Internal Medicine

## 2021-09-16 NOTE — ASSESSMENT & PLAN NOTE
-Patient had a stroke in 2011.  Patient is unsure of specific date.  -Patient notes residual left-sided weakness.  -Patient is able to ambulate and perform daily activities without issue.  Recent decrease in activity secondary to shortness of breath from primary illness.

## 2021-09-16 NOTE — DISCHARGE PLANNING
Anticipated Discharge Disposition: Home    Action: per chart review pt pending medical clearance. Pt currently on 1 liter O2, none at baseline. 6 Clicks are 24. Pt lives locally, has a spouse, and a PCP.    Barriers to Discharge: medical clearance    Plan: f/u with medical team and pt to discuss dc needs and barriers.

## 2021-09-16 NOTE — CONSULTS
"[facility] -  CONSULTATION NOTE               Author: Matthew Mix M.D. 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 Nephrology has been consulted for his prior complex renal history and management recommendations.   Pt is only Burkinan speaking and Bian helped us as  interpretor. According to the patient he was last seen by Nephrology in [place] 10/2020 and told he has \"Lupus Nephritis\", started on medications. He then saw a provider at [facility] who told him that he only has \"3 months to live\" and subsequently went to [place] where he established with a Nephrologist.   Dr. Chen was able to access his medical records from [facility] nephrology and confirmed the diagnosis, proteinuria of 4.5g was noted. At that time he was started on Mycophenolate and dexamethasone, he was planning to go to [place] for 5 months after.  He reports that he did not take the medications he was recommended to start by his Nephrologist here (Dr. Blair Luis- [facility]). Tells me his wife is bringing his medical records from [place] to the hospital today.     Currently in the hospital he is being seen by our Pulmonology colleagues for worsened ground glass opacities on CT chest, further work up and management.    No F/C/N/V/CP.  No melena, hematochezia, hematemesis.  No HA, visual changes, or abdominal pain.    REVIEW OF SYSTEMS:    10 point ROS was performed and is as per HPI or otherwise negative    PAST MEDICAL HISTORY:   - " "HTN  - CVA  - CKD   -  Lupus nephritis class 3, 5   - CAD    PAST SURGICAL HISTORY:   - Appendectomy    FAMILY HISTORY:   - Reviewed and non contributory to current illness    SOCIAL HISTORY:   - Currently smokes 3-4 cig's /day  - Drinks EtOH socially  - No illicits    HOME MEDICATIONS:   - Reviewed and documented in chart    LABORATORY STUDIES:   - Reviewed and documented in chart    ALLERGIES:  Patient has no known allergies.    VS:  /93   Pulse 98   Temp 36.7 °C (98.1 °F) (Temporal)   Resp 18   Ht 1.753 m (5' 9\")   Wt 92.1 kg (203 lb 0.7 oz)   SpO2 94%   BMI 29.98 kg/m²        Physical Exam  Vitals and nursing note reviewed.   Constitutional:       Appearance: Normal appearance. He is not ill-appearing.   HENT:      Head: Normocephalic.      Nose: Nose normal.      Mouth/Throat:      Mouth: Mucous membranes are moist.   Eyes:      Extraocular Movements: Extraocular movements intact.      Pupils: Pupils are equal, round, and reactive to light.   Cardiovascular:      Rate and Rhythm: Normal rate and regular rhythm.      Pulses: Normal pulses.      Heart sounds: Normal heart sounds.   Pulmonary:      Effort: Pulmonary effort is normal. No respiratory distress.      Breath sounds: Normal breath sounds. No stridor. No wheezing.   Abdominal:      General: There is no distension.      Palpations: Abdomen is soft.      Tenderness: There is no abdominal tenderness.   Musculoskeletal:         General: Normal range of motion.      Cervical back: Normal range of motion.      Right lower leg: No edema.      Left lower leg: No edema.   Skin:     Coloration: Skin is not jaundiced or pale.      Findings: No erythema.   Neurological:      General: No focal deficit present.      Mental Status: He is alert and oriented to person, place, and time.   Psychiatric:         Mood and Affect: Mood normal.         FLUID BALANCE:  In: 1000   Out: -     LABS:  Recent Labs     09/15/21  0758 09/16/21  0039   SODIUM 141 139 "   POTASSIUM 3.3* 4.0   CHLORIDE 108 105   CO2 23 21   GLUCOSE 99 159*   BUN 26* 34*   CREATININE 1.37 1.55*   CALCIUM 8.4* 8.8        IMAGING:  - Imaging studies reviewed by me    IMPRESSION:  # CKD stage 3 A3 2/2 SLE nephritis class III, V (biopsy in 10/2020: Full house Immune complex mediated glomerulonephritis. Membranous and focal, crescenteric Glomerulonephritis)  - Baseline Cr 1.3-1.4, now cr is close to baseline   - Proteinuria 4.5g in 10/2020  - renal US and ua pending   - was supposed to be on MMF and prednisone, which he had not started   - Followed in [place] with nephrologist, wife is bringing med records.   #  Dyspnea  2/2 ILD  - pulmonary is on board   - on steroids   # HTN, at the goal    # Anemia   - iron panel pending   # Acidosis 2/2 CKD, mild, monitor   # NSTEMI    PLAN:  - Obtain health records of his care in [place] (wife bringing to the hosp). Pt is confused about his diagnosis and was told different things by his providers.  - UA , Urine Lytes, renal U/S were ordered.  - iron panel   - Complement levels and ds DNA levels    - Avoid nephrotoxins  - After following labs and reviewing his records will ascertain the treatment options.     We will continue to follow.     Case discussed with Dr. Chen who agrees with the assessment and plan     Thank you for the consultation!    The patient was evaluated with the Resident.  I reviewed the note and discussed the findings.  Please see plan in the note for full details regarding this patient.  The chart was reviewed and summarized.  Available labs, imaging, and pertinent patient data were also reviewed.  Available nursing, consultant, and other records were also reviewed.  I am actively involved in the patient's care.

## 2021-09-16 NOTE — FLOWSHEET NOTE
Assumed care of pt, received bedside report from Oneil ANNE. Pt sitting up in bed, no complaints of pain, 1L NC, A/Ox4. Fall and safety precautions in place, strip alarm in place. Discussed POC with pt, pt verbalizes understanding. No further needs at this time.

## 2021-09-16 NOTE — FACE TO FACE
"      Face to Face Note  -  Durable Medical Equipment    Muna Caicedo M.D. - NPI: [id number]  I certify that this patient is under my care and that they had a durable medical equipment(DME)face to face encounter by myself that meets the physician DME face-to-face encounter requirements with this patient on:    Date of encounter:   Patient:                    MRN:                       YOB: 2022  Lamont Roach  [id number]  1970     The encounter with the patient was in whole, or in part, for the following medical condition, which is the primary reason for durable medical equipment:  Other - hypoxia after COVID vaccine    I certify that, based on my findings, the following durable medical equipment is medically necessary:  Oxygen.    HOME O2 Saturation Measurements:(Values must be present for Home Oxygen orders)  Room air sat at rest: 87  Room air sat with amb: 85  With liters of O2: 1, O2 sat at rest with O2: 92  With Liters of O2: 1, O2 sat with amb with O2 : 91  Is the patient mobile?: Yes    My Clinical findings support the need for the above equipment due to:  Hypoxia    Supporting Symptoms: The patient requires supplemental oxygen, as the following interventions have been tried with limited or no improvement: \"Ambulation with oximetry    If patient feels more short of breath, they can go up to 6 liters per minute and contact healthcare provider.  "

## 2021-09-16 NOTE — CARE PLAN
The patient is Stable - Low risk of patient condition declining or worsening    Shift Goals  Clinical Goals: Wean O2, rest  Patient Goals: Home when able    Progress made toward(s) clinical / shift goals:     Problem: Respiratory  Goal: Patient will achieve/maintain optimum respiratory ventilation and gas exchange  Outcome: Progressing     Problem: Self Care  Goal: Patient will have the ability to perform ADLs independently or with assistance (bathe, groom, dress, toilet and feed)  Outcome: Progressing     Problem: Discharge Planning - Pneumonia  Goal: Patient will verbalize understanding of lifestyle changes and therapeutic needs post discharge  Outcome: Progressing

## 2021-09-16 NOTE — ASSESSMENT & PLAN NOTE
-Patient notes 4 days of shortness of breath, subjective fever, productive cough.  -, troponin 27, lactate 1.5, procalcitonin 0.41, D-dimer 1.31, CRP 20.89  -EKG indicates sinus tachycardia, CT PE and chest x-ray indicate significant interstitial edema, imaging looks similar to hospitalization in October 2020.  -Patient received COVID-19 vaccine 4 days ago, states that his symptoms started around that time.  However notes that his symptoms are very similar to 2 hospitalizations that occurred last year.  Patient notes that he has had travel outside the country to [place] for 8 months from 10/2020-04/2020.  -Patient was negative for COVID-19, was also tested during previous hospitalizations and was negative.  History and labs follow a infective pattern however autoimmune in chronic conditions cannot be completely ruled out.  -C. jerzy, Legionella, MPO and IN-3 with reflex ANCA, strep pneumo, QuantiFERON, Brucella labs also sent.  Given outside travel and working construction, chronic ILD should be a consideration.  -Patient will be admitted to telemetry for potential Covid infection and further work-up of interstitial edema and hypoxia.  -Can consider pulmonology consult tomorrow for further work up of  potential chronic interstitial lung disease.  -Patient is severely hypoxic on admission at 69% and required supplemental oxygen, in the meantime patient will be treated as a pneumonia.  Received a single dose of Unasyn and azithromycin in the ED, if patient continues to deteriorate or requires more oxygen can continue Unasyn.  -Patient had blood cultures drawn in ED, please follow-up in the morning.  -Can utilize albuterol, duo nebs as needed.  -We will repeat procalcitonin in the morning.

## 2021-09-16 NOTE — ASSESSMENT & PLAN NOTE
Patient has a history of stroke in 2011 with residual left-sided weakness.  The weakness does not interfere with his activities of daily living, and he is able to take care of himself well.

## 2021-09-16 NOTE — ASSESSMENT & PLAN NOTE
Patient has a history of hypertension, however, his blood pressures have been well controlled in the hospital.  On amlodipine 5 mg and lisinopril 5 mg once a day.  -The renal biopsy 10/2020 showed segmental glomerulosclerosis and mild tubular atrophy with fibrosis and mild arteriosclerosis with some arteriolar hyalinosis.  These findings are probably the result of his hypertension.

## 2021-09-17 VITALS
HEART RATE: 95 BPM | HEIGHT: 69 IN | TEMPERATURE: 97.5 F | WEIGHT: 204.81 LBS | OXYGEN SATURATION: 95 % | BODY MASS INDEX: 30.33 KG/M2 | RESPIRATION RATE: 16 BRPM | SYSTOLIC BLOOD PRESSURE: 119 MMHG | DIASTOLIC BLOOD PRESSURE: 80 MMHG

## 2021-09-17 DIAGNOSIS — M32.13 LUPUS DISEASE OF LUNG (HCC): ICD-10-CM

## 2021-09-17 LAB
ALBUMIN SERPL BCP-MCNC: 3.1 G/DL (ref 3.2–4.9)
ALBUMIN/GLOB SERPL: 0.9 G/DL
ALP SERPL-CCNC: 82 U/L (ref 30–99)
ALT SERPL-CCNC: 9 U/L (ref 2–50)
ANION GAP SERPL CALC-SCNC: 9 MMOL/L (ref 7–16)
AST SERPL-CCNC: 12 U/L (ref 12–45)
BILIRUB SERPL-MCNC: 0.3 MG/DL (ref 0.1–1.5)
BUN SERPL-MCNC: 31 MG/DL (ref 8–22)
C3 SERPL-MCNC: 99.2 MG/DL (ref 87–200)
C4 SERPL-MCNC: 9.4 MG/DL (ref 19–52)
CALCIUM SERPL-MCNC: 8.6 MG/DL (ref 8.5–10.5)
CHLORIDE SERPL-SCNC: 106 MMOL/L (ref 96–112)
CO2 SERPL-SCNC: 25 MMOL/L (ref 20–33)
CREAT SERPL-MCNC: 1.3 MG/DL (ref 0.5–1.4)
ERYTHROCYTE [DISTWIDTH] IN BLOOD BY AUTOMATED COUNT: 42.6 FL (ref 35.9–50)
GLOBULIN SER CALC-MCNC: 3.4 G/DL (ref 1.9–3.5)
GLUCOSE SERPL-MCNC: 99 MG/DL (ref 65–99)
HCT VFR BLD AUTO: 32.9 % (ref 42–52)
HGB BLD-MCNC: 11.4 G/DL (ref 14–18)
HGB RETIC QN AUTO: 26 PG/CELL (ref 29–35)
IMM RETICS NFR: 27 % (ref 9.3–17.4)
IRON SATN MFR SERPL: 34 % (ref 15–55)
IRON SERPL-MCNC: 75 UG/DL (ref 50–180)
L PNEUMO AG UR QL IA: NEGATIVE
MCH RBC QN AUTO: 30 PG (ref 27–33)
MCHC RBC AUTO-ENTMCNC: 34.7 G/DL (ref 33.7–35.3)
MCV RBC AUTO: 86.6 FL (ref 81.4–97.8)
PLATELET # BLD AUTO: 129 K/UL (ref 164–446)
PMV BLD AUTO: 11.4 FL (ref 9–12.9)
POTASSIUM SERPL-SCNC: 3.9 MMOL/L (ref 3.6–5.5)
PROCALCITONIN SERPL-MCNC: 0.09 NG/ML
PROT SERPL-MCNC: 6.5 G/DL (ref 6–8.2)
RBC # BLD AUTO: 3.8 M/UL (ref 4.7–6.1)
RETICS # AUTO: 0.1 M/UL (ref 0.04–0.06)
RETICS/RBC NFR: 2.5 % (ref 0.8–2.1)
S PNEUM AG UR QL: NEGATIVE
SODIUM SERPL-SCNC: 140 MMOL/L (ref 135–145)
TIBC SERPL-MCNC: 220 UG/DL (ref 250–450)
UIBC SERPL-MCNC: 145 UG/DL (ref 110–370)
WBC # BLD AUTO: 8.1 K/UL (ref 4.8–10.8)

## 2021-09-17 PROCEDURE — 700111 HCHG RX REV CODE 636 W/ 250 OVERRIDE (IP): Performed by: STUDENT IN AN ORGANIZED HEALTH CARE EDUCATION/TRAINING PROGRAM

## 2021-09-17 PROCEDURE — 99238 HOSP IP/OBS DSCHRG MGMT 30/<: CPT | Mod: GC | Performed by: HOSPITALIST

## 2021-09-17 PROCEDURE — 83550 IRON BINDING TEST: CPT

## 2021-09-17 PROCEDURE — 86225 DNA ANTIBODY NATIVE: CPT

## 2021-09-17 PROCEDURE — A9270 NON-COVERED ITEM OR SERVICE: HCPCS | Performed by: STUDENT IN AN ORGANIZED HEALTH CARE EDUCATION/TRAINING PROGRAM

## 2021-09-17 PROCEDURE — 86160 COMPLEMENT ANTIGEN: CPT

## 2021-09-17 PROCEDURE — 36415 COLL VENOUS BLD VENIPUNCTURE: CPT

## 2021-09-17 PROCEDURE — 700102 HCHG RX REV CODE 250 W/ 637 OVERRIDE(OP): Performed by: STUDENT IN AN ORGANIZED HEALTH CARE EDUCATION/TRAINING PROGRAM

## 2021-09-17 PROCEDURE — 85027 COMPLETE CBC AUTOMATED: CPT

## 2021-09-17 PROCEDURE — 83540 ASSAY OF IRON: CPT

## 2021-09-17 PROCEDURE — 85046 RETICYTE/HGB CONCENTRATE: CPT

## 2021-09-17 PROCEDURE — 80053 COMPREHEN METABOLIC PANEL: CPT

## 2021-09-17 PROCEDURE — 84145 PROCALCITONIN (PCT): CPT

## 2021-09-17 RX ORDER — PREDNISONE 20 MG/1
40 TABLET ORAL DAILY
Status: DISCONTINUED | OUTPATIENT
Start: 2021-09-17 | End: 2021-09-17 | Stop reason: HOSPADM

## 2021-09-17 RX ORDER — LISINOPRIL 5 MG/1
5 TABLET ORAL DAILY
Qty: 30 TABLET | Refills: 0 | Status: SHIPPED | OUTPATIENT
Start: 2021-09-18 | End: 2021-09-17

## 2021-09-17 RX ORDER — PREDNISONE 20 MG/1
TABLET ORAL
Qty: 30 TABLET | Refills: 0 | Status: SHIPPED | OUTPATIENT
Start: 2021-09-17 | End: 2021-09-18

## 2021-09-17 RX ORDER — SULFAMETHOXAZOLE AND TRIMETHOPRIM 800; 160 MG/1; MG/1
1 TABLET ORAL
Status: DISCONTINUED | OUTPATIENT
Start: 2021-09-20 | End: 2021-09-17 | Stop reason: HOSPADM

## 2021-09-17 RX ORDER — SULFAMETHOXAZOLE AND TRIMETHOPRIM 800; 160 MG/1; MG/1
1 TABLET ORAL
Qty: 15 TABLET | Refills: 0 | Status: SHIPPED | OUTPATIENT
Start: 2021-09-20 | End: 2021-09-18

## 2021-09-17 RX ORDER — ENALAPRIL MALEATE 10 MG/1
10 TABLET ORAL DAILY
Qty: 90 TABLET | Refills: 3 | Status: SHIPPED | OUTPATIENT
Start: 2021-09-17 | End: 2021-09-18 | Stop reason: SDUPTHER

## 2021-09-17 RX ADMIN — AMLODIPINE BESYLATE 5 MG: 5 TABLET ORAL at 05:34

## 2021-09-17 RX ADMIN — DOCUSATE SODIUM 50 MG AND SENNOSIDES 8.6 MG 2 TABLET: 8.6; 5 TABLET, FILM COATED ORAL at 05:35

## 2021-09-17 RX ADMIN — ENOXAPARIN SODIUM 40 MG: 40 INJECTION SUBCUTANEOUS at 05:35

## 2021-09-17 RX ADMIN — PREDNISONE 40 MG: 20 TABLET ORAL at 14:07

## 2021-09-17 RX ADMIN — LISINOPRIL 5 MG: 5 TABLET ORAL at 05:35

## 2021-09-17 RX ADMIN — ASPIRIN 81 MG: 81 TABLET, CHEWABLE ORAL at 05:34

## 2021-09-17 NOTE — PROGRESS NOTES
Pt d/c. Discharge paperwork gone over with pt and family. Monitor room notified. Home O2 delivered to bedside. Pt and family verbalized understanding.

## 2021-09-17 NOTE — DISCHARGE INSTRUCTIONS
Discharge Instructions    Discharged to home by car with relative. Discharged via walking, hospital escort: Refused.  Special equipment needed: Oxygen    Be sure to schedule a follow-up appointment with your primary care doctor or any specialists as instructed.     Discharge Plan:   Diet Plan: Discussed  Activity Level: Discussed  Confirmed Follow up Appointment: Appointment Scheduled  Confirmed Symptoms Management: Discussed  Medication Reconciliation Updated: Yes    I understand that a diet low in cholesterol, fat, and sodium is recommended for good health. Unless I have been given specific instructions below for another diet, I accept this instruction as my diet prescription.   Other diet: regular    Special Instructions: None    · Is patient discharged on Warfarin / Coumadin?   No     Depression / Suicide Risk    As you are discharged from this [facility] facility, it is important to learn how to keep safe from harming yourself.    Recognize the warning signs:  · Abrupt changes in personality, positive or negative- including increase in energy   · Giving away possessions  · Change in eating patterns- significant weight changes-  positive or negative  · Change in sleeping patterns- unable to sleep or sleeping all the time   · Unwillingness or inability to communicate  · Depression  · Unusual sadness, discouragement and loneliness  · Talk of wanting to die  · Neglect of personal appearance   · Rebelliousness- reckless behavior  · Withdrawal from people/activities they love  · Confusion- inability to concentrate     If you or a loved one observes any of these behaviors or has concerns about self-harm, here's what you can do:  · Talk about it- your feelings and reasons for harming yourself  · Remove any means that you might use to hurt yourself (examples: pills, rope, extension cords, firearm)  · Get professional help from the community (Mental Health, Substance Abuse, psychological counseling)  · Do not be  alone:Call your Safe Contact- someone whom you trust who will be there for you.  · Call your local CRISIS HOTLINE 990-1847 or 950-887-7024  · Call your local Children's Mobile Crisis Response Team [place] (928) 090-3600 or www.PlayScape  · Call the toll free National Suicide Prevention Hotlines   · National Suicide Prevention Lifeline 676-784-GXLC (5874)  · National Third Screen Media Line Network 800-SUICIDE (388-6051)    Prednisone tablets  ¿Qué es britt medicamento?  La PREDNISONA es un corticosteroide. Se utiliza para tratar la inflamación de la piel, articulaciones, pulmones y otros órganos. Condiciones comunes tratadas incluyen asma, alergias y artritis. También se utiliza para otras condiciones, tales nicolasa trastornos sanguíneos o enfermedades de la glándula suprarrenal.  Britt medicamento puede ser utilizado para otros usos; si tiene alguna pregunta consulte con olivas proveedor de atención médica o con olivas farmacéutico.  MARCAS COMUNES: Deltasone, Predone, Sterapred, Sterapred DS  ¿Qué le tejas informar a mi profesional de la kenroy antes de meche britt medicamento?  Necesita saber si usted presenta alguno de los siguientes problemas o situaciones:  · Síndrome de [place]  · diabetes  · glaucoma  · enfermedad cardiaca  · amarilis presión sanguínea  · infección (especialmente infecciones virales, nicolasa varicela o herpes)  · enfermedad renal  · enfermedad hepática  · problemas mentales  · miastenia gravis  · osteoporosis  · convulsiones  · problemas estomacales o intestinales  · enfermedad tiroidea  · anam reacción alérgica o inusual a la lactosa, a la prednisona, a otros medicamentos, alimentos, colorantes o conservantes  · si está embarazada o buscando quedar embarazada  · si está amamantando a un bebé  ¿Cómo tejas utilizar britt medicamento?  [place] britt medicamento por vía oral con un vaso de agua. Siga las instrucciones de la etiqueta del medicamento. [place] britt medicamento con alimentos. Si está tomando britt medicamento anam  vez por día, tómelo en la mañana. No tome más medicamento que la cantidad indicada. No deje de usar olivas medicamento repentinamente porque puede desarrollar anam reacción grave. Olivas médico le dirá cuánto medicamento meche. Si olivas médico desea que deje de usar el medicamento, es probable que la dosis se vaya disminuyendo lentamente kajal un tiempo para evitar cualquier efecto secundario.  Hable con olivas pediatra para informarse acerca del uso de britt medicamento en niños. Puede requerir atención especial.  Sobredosis: Póngase en contacto inmediatamente con un centro toxicológico o anam martha de urgencia si usted archie que haya tomado demasiado medicamento.  ATENCIÓN: Britt medicamento es solo para usted. No comparta britt medicamento con nadie.  ¿Qué sucede si me olvido de anam dosis?  Si olvida anam dosis, tómela tan pronto nicolasa sea posible. Si es ernst la hora de olivas dosis siguiente, consulte a olivas médico o a olivas profesional de la kenroy. Usted puede necesitar saltar anam dosis o meche anam dosis adicional. No tome dosis dobles o adicionales sin asesoramiento.  ¿Qué puede interactuar con britt medicamento?  No tome esta medicina con ninguno de los siguientes medicamentos:  · metirapona  · mifepristona  Esta medicina también puede interactuar con los siguientes medicamentos:  · aminoglutetimida  · anfotericina B  · aspirina o medicamentos tipo aspirina  · barbitúricos  · ciertos medicamentos para la diabetes, tales nicolasa glipizida o gliburida  · colestiramina  · inhibidores de colinesterasa  · ciclosporina  · digoxina  · diuréticos  · efedrina  · hormonas femeninas, nicolasa estrógenos, progestinas o píldoras anticonceptivas  · isoniazida  · quetoconazol  · los BÁRBARA, medicamentos para el dolor o inflamación, nicolasa ibuprofeno o naproxeno  · fenitoína  · rifampicina  · toxoide  · vacunas  · warfarina  Puede ser que esta lista no menciona todas las posibles interacciones. Informe a olivas profesional de la kenroy de todos los productos a base de  hierbas, medicamentos de venta sourav o suplementos nutritivos que esté tomando. Si usted fuma, consume bebidas alcohólicas o si utiliza drogas ilegales, indíqueselo también a olivas profesional de la kenroy. Algunas sustancias pueden interactuar con olivas medicamento.  ¿A qué tejas estar atento al usar britt medicamento?  Visite a olivas médico o a olivas profesional de la kenroy para que revise regularmente olivas evolución. Si está usando britt medicamento por mucho tiempo, lleve anam tarjeta de identificación con olivas nombre y dirección, el tipo y la dosis del medicamento, y el nombre y la dirección de olivas médico.  Britt medicamento puede aumentar olivas riesgo de contraer anam infección. Informe a olivas médico o a olivas profesional de la kenroy si está expuesto a cualquier persona con sarampión o varicela, o si desarrolla llagas o ampollas que no sanan correctamente.  Si le van a realizar anam cirugía, informe a olivas médico o profesional de la kenroy que ha tomado britt medicamento en los últimos doce meses.  Consulte a olivas médico o a olivas profesional de la kenroy acerca de olivas dieta. Es posible que deba disminuir la cantidad de sal que come.  Britt medicamento puede aumentar los niveles de azúcar en la giselle. Pregúntele a olivas profesional de la kenroy si es necesario realizar cambios en la dieta o en los medicamentos si usted tiene diabetes.  ¿Qué efectos secundarios puedo tener al utilizar britt medicamento?  Efectos secundarios que debe informar a olivas médico o a olivas profesional de la kenroy tan pronto nicolasa sea posible:  reacciones alérgicas, nicolasa erupción cutánea, comezón/picazón o urticaria, e hinchazón de la seng, los labios o la lengua cambios en las emociones o el estado de ánimo cambios en la visión estado de ánimo deprimido dolor ocular fiebre o escalofríos, tos, dolor de garganta, dolor o dificultad para orinar signos y síntomas de niveles altos de azúcar en la giselle, nicolasa tener más sed o apetito, o tener que orinar con mayor frecuencia que lo habitual.  También puede sentirse muy cansado o tener visión borrosa. hinchazón de tobillos, pies  Efectos secundarios que generalmente no requieren atención médica (infórmelos a olivas médico o a olivas profesional de la kenroy si persisten o si son molestos):  confusión, excitación, inquietud dolor de alexandra náuseas, vómito problemas de piel, acné, piel delgada y brillante dificultad para dormir aumento de peso  Puede ser que esta lista no menciona todos los posibles efectos secundarios. Comuníquese a olivas médico por asesoramiento médico sobre los efectos secundarios. Usted puede informar los efectos secundarios a la FDA por teléfono al [phone]-2103.  ¿Dónde tejas guardar mi medicina?  Manténgala fuera del alcance de los niños.  Guárdela a temperatura ambiente, entre 15 y 30 grados C (59 y 86 grados F). Protejála sarmad. Mantenga el envase kota cerrado. Deseche los medicamentos que no haya utilizado, después de la fecha de vencimiento.  ATENCIÓN: Britt folleto es un resumen. Puede ser que no cubra toda la posible información. Si usted tiene preguntas acerca de esta medicina, consulte con olivas médico, olivas farmacéutico o olivas profesional de la kenroy.  © 2020 Elsevier/Gold Standard (2019-10-28 00:00:00)    Sulfamethoxazole; Trimethoprim, SMX-TMP tablets  ¿Qué es britt medicamento?  El compuesto SULFAMETOXAZOL; TRIMETOPRIMA o SMX-TMP es anam combinación de un antibiótico sulfonamida y un ferdinand antibiótico, trimetoprima. Se utiliza para tratar o prevenir ciertos tipos de infecciones bacterianas. No es efectivo para resfríos, gripe u otras infecciones de origen viral.  Britt medicamento puede ser utilizado para otros usos; si tiene alguna pregunta consulte con olivas proveedor de atención médica o con olivas farmacéutico.  MARCAS COMUNES: Bacter-Aid DS, Bactrim, Bactrim DS, Septra, Septra DS  ¿Qué le tejas informar a mi profesional de la kenroy antes de meche britt medicamento?  Necesita saber si usted presenta alguno de los siguientes problemas o  situaciones:  · anemia  · asma  · recibe tratamiento con anticonvulsivos  · si consume bebidas alcohólicas con frecuencia  · enfermedad renal  · enfermedad hepática  · bajo nivel de ácido fólico o de [id number] deshidrogenasa  · malnutrición o malabsorción  · yolie  · alergias severas  · trastorno tiroideo  · anam reacción alérgica o inusual al sulfametoxazol, a la trimetoprima, sulfonamidas, a otros medicamentos, alimentos, colorantes o conservantes  · si está embarazada o buscando quedar embarazada  · si está amamantando a un bebé  ¿Cómo tejas utilizar britt medicamento?  Ault britt medicamento por vía oral con un vaso lleno de agua. Siga las instrucciones de la etiqueta del medicamento. Ault rufus dosis a intervalos regulares. No tome olivas medicamento con anam frecuencia mayor que la indicada. No omita ninguna dosis o suspenda el uso de olivas medicamento antes de lo indicado.  Hable con olivas pediatra para informarse acerca del uso de britt medicamento en niños. Puede requerir atención especial. Aunque britt medicamento puede ser recetado a niños tan menores nicolasa de 2 meses de edad.  Sobredosis: Póngase en contacto inmediatamente con un centro toxicológico o anam martha de urgencia si usted archie que haya tomado demasiado medicamento.  ATENCIÓN: Britt medicamento es solo para usted. No comparta britt medicamento con nadie.  ¿Qué sucede si me olvido de anam dosis?  Si olvida anam dosis, tómela lo antes posible. Si es ernst la hora de la próxima dosis, tome sólo jesus dosis. No tome dosis adicionales o dobles.  ¿Qué puede interactuar con britt medicamento?  No tome britt medicamento con ninguno de los siguientes fármacos: aminobenzoato de potasio dofetilida metronidazol  Britt medicamento también puede interactuar con los siguientes medicamentos: inhibidores de la ECA, tales nicolasa benazepril, enalapril, lisinopril, y ramipril píldoras anticonceptivas ciclosporina digoxina diuréticos indometacina medicamentos para la diabetes metenamina  metotrexato fenitoína suplementos de potasio pirimetamina sulfinpirazona antidepresivos tricíclicos warfarina  Puede ser que esta lista no menciona todas las posibles interacciones. Informe a olivas profesional de la kenroy de todos los productos a base de hierbas, medicamentos de venta sourav o suplementos nutritivos que esté tomando. Si usted fuma, consume bebidas alcohólicas o si utiliza drogas ilegales, indíqueselo también a olivas profesional de la kenroy. Algunas sustancias pueden interactuar con olivas medicamento.  ¿A qué tejas estar atento al usar shanon medicamento?  Consulte a olivas médico o a olivas profesional de la kenroy si rufus síntomas no mejoran. Telma varios vasos de agua por día. [place] le ayudará a reducir el riesgo de padecer problemas renales.  No trate la diarrea con productos de venta sourav. Comuníquese con olivas médico si tiene diarrea que dura más de 2 días o si es severa y acuosa.  Shanon medicamento puede aumentar la sensibilidad al sol. Manténgase fuera sarmad solar. Si no lo puede evitar, utilice ropa protectora y crema de protección solar. No utilice lámparas jose, zach jose ni cabinas jose.  ¿Qué efectos secundarios puedo tener al utilizar shanon medicamento?  Efectos secundarios que debe informar a olivas médico o a olivas profesional de la kenroy tan pronto nicolasa sea posible:  · reacciones alérgicas nicolasa erupción cutánea o urticarias, hinchazón de la seng, labios o lengua  · problemas respiratorios  · fiebre, escalofríos, dolor de garganta  · pulso cardiaco irregular o dolor en el pecho  · ahmet articulares o musculares  · dolor o dificultad para orinar  · puntos rojos en la piel  · enrojecimiento, formación de ampollas, descamación o distensión de la piel, inclusive dentro de la boca  · sangrado o magulladuras inusuales  · cansancio o debilidad inusual  · color amarillento de los ojos o la piel  Efectos secundarios que, por lo general, no requieren atención médica (debe informarlos a olivas médico o a olivas profesional  de la kenroy si persisten o si son molestos):  · diarrea  · mareos  · dolor de alexandra  · pérdida del apetito  · náuseas, vómito  · nerviosismo  Puede ser que esta lista no menciona todos los posibles efectos secundarios. Comuníquese a olivas médico por asesoramiento médico sobre los efectos secundarios. Usted puede informar los efectos secundarios a la FDA por teléfono al 1-800[facility]-[phone].  ¿Dónde tejas guardar mi medicina?  Manténgala fuera del alcance de los niños.  Guárdela a temperatura ambiente, entre 20 y 25 grados C (68 y 77 grados F). Protéjala sarmad. Deseche todo el medicamento que no haya utilizado, después de la fecha de vencimiento.  ATENCIÓN: Shanon folleto es un resumen. Puede ser que no cubra toda la posible información. Si usted tiene preguntas acerca de esta medicina, consulte con olivas médico, olivas farmacéutico o olivas profesional de la kenroy.  © 2020 Elsevier/Gold Standard (2018-01-18 00:00:00)

## 2021-09-17 NOTE — PROGRESS NOTES
"[facility] -  PROGRESS NOTE               Author: Matthew Mix M.D. 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 Nephrology has been consulted for his prior complex renal history and management recommendations.   Pt is only Grenadian speaking and Bina helped us as  interpretor. According to the patient he was last seen by Nephrology in [place] 10/2020 and told he has \"Lupus Nephritis\", started on medications. He then saw a provider at [facility] who told him that he only has \"3 months to live\" and subsequently went to [place] where he established with a Nephrologist.   Dr. Chen was able to access his medical records from [facility] nephrology and confirmed the diagnosis, proteinuria of 4.5g was noted. At that time he was started on Mycophenolate and dexamethasone, he was planning to go to [place] for 5 months after.  He reports that he did not take the medications he was recommended to start by his Nephrologist here (Dr. Blair Luis- [facility]). Tells me his wife is bringing his medical records from [place] to the hospital today.      Currently in the hospital he is being seen by our Pulmonology colleagues for worsened ground glass opacities on CT chest, further work up and management.     No F/C/N/V/CP.  No melena, hematochezia, hematemesis.  No HA, visual changes, or abdominal pain.    DAILY NEPHROLOGY SUMMARY:  9/17: NAOE, renal function stable around his baseline. Reviewed blood work from his stay in [place] ,Cr 1.3-1.4 , CHIKI and RF neg.    REVIEW OF SYSTEMS:    10 point ROS reviewed and is as per HPI/daily " "summary or otherwise negative    PMH/PSH/SH/FH: Reviewed and unchanged since admission note  CURRENT MEDICATIONS: Reviewed from admission to present day    VS:  /80   Pulse 95   Temp 36.4 °C (97.5 °F) (Temporal)   Resp 16   Ht 1.753 m (5' 9\")   Wt 92.9 kg (204 lb 12.9 oz)   SpO2 95%   BMI 30.24 kg/m²   Physical Exam  Vitals and nursing note reviewed.   Constitutional:       Appearance: Normal appearance. He is not ill-appearing.   HENT:      Head: Normocephalic.      Nose: Nose normal.      Mouth/Throat:      Mouth: Mucous membranes are moist.   Eyes:      Extraocular Movements: Extraocular movements intact.      Pupils: Pupils are equal, round, and reactive to light.   Cardiovascular:      Rate and Rhythm: Normal rate and regular rhythm.      Pulses: Normal pulses.      Heart sounds: Normal heart sounds.   Pulmonary:      Effort: Pulmonary effort is normal. No respiratory distress.      Breath sounds: Normal breath sounds. No stridor. No wheezing.   Abdominal:      General: There is no distension.      Palpations: Abdomen is soft.      Tenderness: There is no abdominal tenderness.   Musculoskeletal:         General: Normal range of motion.      Cervical back: Normal range of motion.      Right lower leg: No edema.      Left lower leg: No edema.   Skin:     Coloration: Skin is not jaundiced or pale.      Findings: No erythema.   Neurological:      General: No focal deficit present.      Mental Status: He is alert and oriented to person, place, and time.   Psychiatric:         Mood and Affect: Mood normal.         Fluids:  In: 640 [P.O.:640]  Out: 1600     LABS:  Recent Labs     09/15/21  0758 09/16/21  0039 09/17/21  0709   SODIUM 141 139 140   POTASSIUM 3.3* 4.0 3.9   CHLORIDE 108 105 106   CO2 23 21 25   GLUCOSE 99 159* 99   BUN 26* 34* 31*   CREATININE 1.37 1.55* 1.30   CALCIUM 8.4* 8.8 8.6       IMPRESSION:  # CKD stage 3 A3 2/2 SLE nephritis class III, V (biopsy in 10/2020: [facility] house Immune " complex mediated glomerulonephritis. 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  - Awaiting above ds DNA , connective tissue disease panel to result  - Avoid nephrotoxins in the interim  - After following labs and reviewing his records will ascertain the treatment options.   - Will require close out pt follow up once discharged      Case discussed with Dr. Chen who agrees with the assessment and plan

## 2021-09-17 NOTE — CARE PLAN
The patient is Stable - Low risk of patient condition declining or worsening    Shift Goals  Clinical Goals: Consults, home O2, DC planning  Patient Goals: Home when able    Progress made toward(s) clinical / shift goals:   Problem: Care Map:  Optimal Outcome for the Pneumonia Patient  Goal: Collection and monitoring of appropriate tests and labs  Outcome: Progressing     Problem: Respiratory  Goal: Patient will achieve/maintain optimum respiratory ventilation and gas exchange  Outcome: Progressing     Problem: Hemodynamics - Pneumonia  Goal: Patient's hemodynamics, fluid balance and neurologic status will be stable or improve  Outcome: Progressing

## 2021-09-17 NOTE — DISCHARGE SUMMARY
Discharge Summary    Date of Admission: 9/15/2021  Date of Discharge: 9/17/2021  Discharging Attending: Maria Teresa Draper M.D.   Discharging Senior Resident: Dr. Flores  Discharging Intern: Dr. De La Garza    CHIEF COMPLAINT ON ADMISSION  Chief Complaint   Patient presents with   • Shortness of Breath     RA saturation 72%, 3L NC 93%. Hx MI. Denies CP. SOB x 4 days. Second Covid vaccine on 9/11/21.       Reason for Admission  Acute Hypoxic Respi    Admission Date  9/15/2021    CODE STATUS  Full Code    HPI & HOSPITAL COURSE  Yaw Roach is a 51-year-old male who is a  by profession now retired after working for 20+ years, with a history of cerebrovascular accident with residual left-sided weakness, hypertension, who received Covid 19 vaccination about 4 days ago prior presentation to the hospital  for recurrent symptoms of shortness of breath similar to previous hospitalizations in Oct 2020. He was initiated on supplemental oxygen along with Unasyn and azithromycin in the ED.        Acute hypoxic respiratory failure secondary to Interstitial Lung Disease  The patient was admitted to telemetry floor for recurrent symptoms of shortness of breath similar to previous hospitalizations in Oct 2020. CT chest consistent with bilateral ground glass opacities. Labs on presentation were consistent with elevated D Dimer, Procalcitonin, Ferritin, CRP, ESR and LDH. He tested negative for COVID 19. Pulmonology was consulted for evaluation of underlying interstitial lung disease. Work up for Legionella Urine Antigen, Streptococcus Pneumonia Urine Ag, Brucella Ab, Myeloperoxidase and PR3 with reflex to ANCA remain in process. The patient was initiated on prednisone 40 with bactrim for PCP prophylaxis per pulmonology.  The patient is scheduled to follow up outpatient with Pulmonology on discharge. The patient underwent home oxygen evaluation prior to discharge.         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 Nephrosclerosis with evidence of  global and focal segmental glomerulosclerosis. Thus Nephrology was consulted for evaluation of underlying glomerulopathy. Urine Analysis and Total urine protein consistent with significant proteinuria. Complement C3 was normal, however complement C4 low. Double stranded DNA along with connective tissue disease panel are underway. Renal ultrasound 9/16/21 was unremarkable for hydronephrosis with mild echogenecity.         Hypertension: Well controlled. Continue Enalapril. Initial addition of lisinopril was later discontinued to resume enalapril as previously prescribed.         Therefore, he is discharged in fair and stable condition to home with close outpatient follow-up.    The patient met 2-midnight criteria for an inpatient stay at the time of discharge.    PHYSICAL EXAM ON DISCHARGE  Temp:  [36.3 °C (97.3 °F)-36.4 °C (97.6 °F)] 36.4 °C (97.5 °F)  Pulse:  [83-97] 95  Resp:  [15-18] 16  BP: (115-135)/(79-84) 119/80  SpO2:  [92 %-96 %] 95 %    Physical Exam  Constitutional:       Appearance: Normal appearance.   HENT:      Head: Normocephalic and atraumatic.   Eyes:      Extraocular Movements: Extraocular movements intact.   Cardiovascular:      Rate and Rhythm: Normal rate.      Heart sounds: No murmur heard.     Pulmonary:      Effort: Pulmonary effort is normal. No respiratory distress.   Abdominal:      General: Abdomen is flat. There is no distension.      Palpations: Abdomen is soft.   Musculoskeletal:      Left lower leg: No edema.   Skin:     General: Skin is warm and dry.   Neurological:      Mental Status: He is oriented to person, place, and time.   Psychiatric:         Mood and Affect: Mood normal.         Discharge Date  9/17/21    FOLLOW UP ITEMS POST DISCHARGE  Follow up with Nephrology and Pulmonology Outpatient    DISCHARGE  DIAGNOSES  Principal Problem:    Chronic interstitial lung disease (HCC) POA: Unknown  Active Problems:    Bilateral pneumonia POA: Yes    H/O: CVA (cerebrovascular accident) POA: Yes    HTN (hypertension) POA: Yes    Glomerulonephritis POA: Unknown  Resolved Problems:    * No resolved hospital problems. *      FOLLOW UP  No future appointments.  No follow-up provider specified.    MEDICATIONS ON DISCHARGE     Medication List      START taking these medications      Instructions   lisinopril 5 MG Tabs  Start taking on: September 18, 2021  Commonly known as: PRINIVIL   Take 1 Tablet by mouth every day for 30 days.  Dose: 5 mg     predniSONE 20 MG Tabs  Commonly known as: DELTASONE   Tidwell     sulfamethoxazole-trimethoprim 800-160 MG tablet  Start taking on: September 20, 2021  Commonly known as: BACTRIM DS   Take 1 Tablet by mouth every Monday, Wednesday, and Friday.  Dose: 1 Tablet        CONTINUE taking these medications      Instructions   amLODIPine 5 MG Tabs  Commonly known as: NORVASC   Take 5 mg by mouth every morning.  Dose: 5 mg     aspirin EC 81 MG Tbec  Commonly known as: ECOTRIN   Take 81 mg by mouth every morning.  Dose: 81 mg        STOP taking these medications    enalapril 10 MG Tabs  Commonly known as: VASOTEC            Allergies  No Known Allergies    DIET  Orders Placed This Encounter   Procedures   • Diet Order Diet: Cardiac     Standing Status:   Standing     Number of Occurrences:   1     Order Specific Question:   Diet:     Answer:   Cardiac [6]       ACTIVITY  As tolerated.  Weight bearing as tolerated    CONSULTATIONS  Dr. Silvia Chen with Nephrology Service consulted.  Treatment options were discussed and plan of care agreed upon. and Dr. Noe Winkler with Pulmonology consulted.  Treatment options were discussed and plan of care agreed upon.    PROCEDURES  none

## 2021-09-17 NOTE — FLOWSHEET NOTE
Assumed care of pt, received bedside report from Mariel ANNE. Pt sitting up in bed, no complaints of pain, room air, A/Ox4. Fall and safety precautions in place, strip alarm in place. Discussed POC with pt, pt verbalizes understanding. No further needs at this time.

## 2021-09-18 LAB
BRUCELLA AB TITR SER AGGL: NORMAL {TITER}
C BURNET PH1 IGG SER QL IF: NEGATIVE
C BURNET PH2 IGG SER QL IF: NEGATIVE
DSDNA AB TITR SER CLIF: 11 IU (ref 0–24)
MYELOPEROXIDASE AB SER-ACNC: 3 AU/ML (ref 0–19)
PROTEINASE3 AB SER-ACNC: 1 AU/ML (ref 0–19)

## 2021-09-18 RX ORDER — SULFAMETHOXAZOLE AND TRIMETHOPRIM 800; 160 MG/1; MG/1
1 TABLET ORAL
Qty: 15 TABLET | Refills: 0 | Status: SHIPPED | OUTPATIENT
Start: 2021-09-20

## 2021-09-18 RX ORDER — ENALAPRIL MALEATE 10 MG/1
10 TABLET ORAL DAILY
Qty: 100 TABLET | Refills: 1 | Status: SHIPPED | OUTPATIENT
Start: 2021-09-18 | End: 2021-10-18

## 2021-09-18 RX ORDER — PREDNISONE 20 MG/1
TABLET ORAL
Qty: 30 TABLET | Refills: 0 | Status: SHIPPED | OUTPATIENT
Start: 2021-09-18 | End: 2021-09-22

## 2021-09-20 LAB
BACTERIA BLD CULT: NORMAL
BACTERIA BLD CULT: NORMAL
CENTROMERE IGG TITR SER IF: 1 AU/ML (ref 0–40)
ENA JO1 AB TITR SER: 1 AU/ML (ref 0–40)
ENA SCL70 IGG SER QL: 18 AU/ML (ref 0–40)
ENA SM IGG SER-ACNC: 1 AU/ML (ref 0–40)
ENA SS-B IGG SER IA-ACNC: 0 AU/ML (ref 0–40)
RIBOSOMAL P AB SER-ACNC: 5 AU/ML (ref 0–40)
SIGNIFICANT IND 70042: NORMAL
SIGNIFICANT IND 70042: NORMAL
SITE SITE: NORMAL
SITE SITE: NORMAL
SOURCE SOURCE: NORMAL
SOURCE SOURCE: NORMAL
SSA52 R0ENA AB IGG Q0420: 2 AU/ML (ref 0–40)
SSA60 R0ENA AB IGG Q0419: 0 AU/ML (ref 0–40)
U1 SNRNP IGG SER QL: 3

## 2021-09-22 RX ORDER — PREDNISONE 20 MG/1
TABLET ORAL
Qty: 30 TABLET | Refills: 0 | Status: SHIPPED | OUTPATIENT
Start: 2021-09-22